# Patient Record
Sex: FEMALE | Race: BLACK OR AFRICAN AMERICAN | Employment: FULL TIME | URBAN - METROPOLITAN AREA
[De-identification: names, ages, dates, MRNs, and addresses within clinical notes are randomized per-mention and may not be internally consistent; named-entity substitution may affect disease eponyms.]

---

## 2017-07-19 ENCOUNTER — HOSPITAL ENCOUNTER (INPATIENT)
Age: 57
LOS: 2 days | Discharge: HOME | DRG: 069 | End: 2017-07-23
Attending: EMERGENCY MEDICINE | Admitting: HOSPITALIST
Payer: COMMERCIAL

## 2017-07-19 ENCOUNTER — APPOINTMENT (OUTPATIENT)
Dept: CT IMAGING | Age: 57
DRG: 069 | End: 2017-07-19
Payer: COMMERCIAL

## 2017-07-19 ENCOUNTER — APPOINTMENT (OUTPATIENT)
Dept: GENERAL RADIOLOGY | Age: 57
DRG: 069 | End: 2017-07-19
Payer: COMMERCIAL

## 2017-07-19 DIAGNOSIS — R41.82 ALTERED MENTAL STATUS, UNSPECIFIED ALTERED MENTAL STATUS TYPE: Primary | ICD-10-CM

## 2017-07-19 PROBLEM — N17.9 ACUTE KIDNEY INJURY (HCC): Status: ACTIVE | Noted: 2017-07-19

## 2017-07-19 PROBLEM — D72.819 LEUKOPENIA: Status: ACTIVE | Noted: 2017-07-19

## 2017-07-19 LAB
ALBUMIN SERPL-MCNC: 3.9 G/DL (ref 3.9–4.9)
ALP BLD-CCNC: 59 U/L (ref 40–130)
ALT SERPL-CCNC: 18 U/L (ref 0–33)
ANION GAP SERPL CALCULATED.3IONS-SCNC: 15 MEQ/L (ref 7–13)
ANISOCYTOSIS: ABNORMAL
APTT: 30.7 SEC (ref 21.6–35.4)
AST SERPL-CCNC: 20 U/L (ref 0–35)
ATYPICAL LYMPHOCYTE RELATIVE PERCENT: 8 %
BACTERIA: NORMAL /HPF
BANDED NEUTROPHILS RELATIVE PERCENT: 4 % (ref 5–11)
BASOPHILS ABSOLUTE: 0 K/UL (ref 0–0.2)
BASOPHILS RELATIVE PERCENT: 0.5 %
BILIRUB SERPL-MCNC: 1.7 MG/DL (ref 0–1.2)
BILIRUBIN URINE: NEGATIVE
BLOOD, URINE: ABNORMAL
BUN BLDV-MCNC: 16 MG/DL (ref 6–20)
C-REACTIVE PROTEIN, HIGH SENSITIVITY: 40.3 MG/L (ref 0–5)
CALCIUM SERPL-MCNC: 9 MG/DL (ref 8.6–10.2)
CHLORIDE BLD-SCNC: 96 MEQ/L (ref 98–107)
CLARITY: CLEAR
CO2: 21 MEQ/L (ref 22–29)
COLOR: YELLOW
CREAT SERPL-MCNC: 1.26 MG/DL (ref 0.5–0.9)
EOSINOPHILS ABSOLUTE: 0 K/UL (ref 0–0.7)
EOSINOPHILS RELATIVE PERCENT: 0.2 %
EPITHELIAL CELLS, UA: NORMAL /HPF
GFR AFRICAN AMERICAN: 53.2
GFR NON-AFRICAN AMERICAN: 44
GLOBULIN: 2.8 G/DL (ref 2.3–3.5)
GLUCOSE BLD-MCNC: 110 MG/DL (ref 74–109)
GLUCOSE URINE: NEGATIVE MG/DL
HCT VFR BLD CALC: 28.9 % (ref 37–47)
HEMATOLOGY PATH CONSULT: YES
HEMOGLOBIN: 9.6 G/DL (ref 12–16)
HYPOCHROMIA: ABNORMAL
INR BLD: 1
KETONES, URINE: NEGATIVE MG/DL
LACTIC ACID: 2 MMOL/L (ref 0.5–2.2)
LEUKOCYTE ESTERASE, URINE: NEGATIVE
LYMPHOCYTES ABSOLUTE: 2.2 K/UL (ref 1–4.8)
LYMPHOCYTES RELATIVE PERCENT: 59 %
MACROCYTES: 0
MCH RBC QN AUTO: 31 PG (ref 27–31.3)
MCHC RBC AUTO-ENTMCNC: 33.4 % (ref 33–37)
MCV RBC AUTO: 92.9 FL (ref 82–100)
MICROCYTES: 0
MONOCYTES ABSOLUTE: 0.5 K/UL (ref 0.2–0.8)
MONOCYTES RELATIVE PERCENT: 15.8 %
NEUTROPHILS ABSOLUTE: 0.6 K/UL (ref 1.4–6.5)
NEUTROPHILS RELATIVE PERCENT: 13 %
NITRITE, URINE: NEGATIVE
PDW BLD-RTO: 17.6 % (ref 11.5–14.5)
PH UA: 5 (ref 5–9)
PLATELET # BLD: 218 K/UL (ref 130–400)
PLATELET SLIDE REVIEW: ADEQUATE
POIKILOCYTES: 0
POLYCHROMASIA: 0
POTASSIUM SERPL-SCNC: 3.9 MEQ/L (ref 3.5–5.1)
PROTEIN UA: NEGATIVE MG/DL
PROTHROMBIN TIME: 10.7 SEC (ref 8.1–13.7)
RBC # BLD: 3.11 M/UL (ref 4.2–5.4)
RBC UA: NORMAL /HPF (ref 0–2)
RENAL EPITHELIAL, UA: NORMAL /HPF
SEDIMENTATION RATE, ERYTHROCYTE: 63 MM (ref 0–30)
SLIDE REVIEW: ABNORMAL
SODIUM BLD-SCNC: 132 MEQ/L (ref 132–144)
SPECIFIC GRAVITY UA: 1 (ref 1–1.03)
TOTAL CK: 62 U/L (ref 0–170)
TOTAL PROTEIN: 6.7 G/DL (ref 6.4–8.1)
TOXIC GRANULATION: ABNORMAL
TROPONIN: <0.01 NG/ML (ref 0–0.01)
TSH SERPL DL<=0.05 MIU/L-ACNC: 3.17 UIU/ML (ref 0.27–4.2)
URINE REFLEX TO CULTURE: YES
UROBILINOGEN, URINE: 0.2 E.U./DL
VACUOLATED NEUTROPHILS: ABNORMAL
WBC # BLD: 3.3 K/UL (ref 4.8–10.8)
WBC UA: NORMAL /HPF (ref 0–5)

## 2017-07-19 PROCEDURE — 87040 BLOOD CULTURE FOR BACTERIA: CPT

## 2017-07-19 PROCEDURE — 85652 RBC SED RATE AUTOMATED: CPT

## 2017-07-19 PROCEDURE — 99285 EMERGENCY DEPT VISIT HI MDM: CPT

## 2017-07-19 PROCEDURE — 6360000002 HC RX W HCPCS: Performed by: HOSPITALIST

## 2017-07-19 PROCEDURE — 85730 THROMBOPLASTIN TIME PARTIAL: CPT

## 2017-07-19 PROCEDURE — 96372 THER/PROPH/DIAG INJ SC/IM: CPT

## 2017-07-19 PROCEDURE — G0378 HOSPITAL OBSERVATION PER HR: HCPCS

## 2017-07-19 PROCEDURE — 80053 COMPREHEN METABOLIC PANEL: CPT

## 2017-07-19 PROCEDURE — 6370000000 HC RX 637 (ALT 250 FOR IP): Performed by: HOSPITALIST

## 2017-07-19 PROCEDURE — 71010 XR CHEST PORTABLE: CPT

## 2017-07-19 PROCEDURE — 93005 ELECTROCARDIOGRAM TRACING: CPT

## 2017-07-19 PROCEDURE — 96361 HYDRATE IV INFUSION ADD-ON: CPT

## 2017-07-19 PROCEDURE — 84443 ASSAY THYROID STIM HORMONE: CPT

## 2017-07-19 PROCEDURE — 6370000000 HC RX 637 (ALT 250 FOR IP): Performed by: EMERGENCY MEDICINE

## 2017-07-19 PROCEDURE — 82550 ASSAY OF CK (CPK): CPT

## 2017-07-19 PROCEDURE — 2580000003 HC RX 258: Performed by: EMERGENCY MEDICINE

## 2017-07-19 PROCEDURE — 36415 COLL VENOUS BLD VENIPUNCTURE: CPT

## 2017-07-19 PROCEDURE — 83605 ASSAY OF LACTIC ACID: CPT

## 2017-07-19 PROCEDURE — 85025 COMPLETE CBC W/AUTO DIFF WBC: CPT

## 2017-07-19 PROCEDURE — 84484 ASSAY OF TROPONIN QUANT: CPT

## 2017-07-19 PROCEDURE — 96374 THER/PROPH/DIAG INJ IV PUSH: CPT

## 2017-07-19 PROCEDURE — 81001 URINALYSIS AUTO W/SCOPE: CPT

## 2017-07-19 PROCEDURE — 85610 PROTHROMBIN TIME: CPT

## 2017-07-19 PROCEDURE — 87086 URINE CULTURE/COLONY COUNT: CPT

## 2017-07-19 PROCEDURE — 86141 C-REACTIVE PROTEIN HS: CPT

## 2017-07-19 PROCEDURE — 70450 CT HEAD/BRAIN W/O DYE: CPT

## 2017-07-19 PROCEDURE — 96375 TX/PRO/DX INJ NEW DRUG ADDON: CPT

## 2017-07-19 RX ORDER — DICYCLOMINE HYDROCHLORIDE 10 MG/1
10 CAPSULE ORAL EVERY 6 HOURS PRN
COMMUNITY
Start: 2017-06-07 | End: 2019-01-01

## 2017-07-19 RX ORDER — DOCUSATE SODIUM 100 MG/1
100 CAPSULE, LIQUID FILLED ORAL 2 TIMES DAILY
Status: DISCONTINUED | OUTPATIENT
Start: 2017-07-19 | End: 2017-07-23 | Stop reason: HOSPADM

## 2017-07-19 RX ORDER — SODIUM CHLORIDE 0.9 % (FLUSH) 0.9 %
10 SYRINGE (ML) INJECTION PRN
Status: DISCONTINUED | OUTPATIENT
Start: 2017-07-19 | End: 2017-07-23 | Stop reason: HOSPADM

## 2017-07-19 RX ORDER — FOLIC ACID 1 MG/1
1 TABLET ORAL DAILY
COMMUNITY
End: 2019-01-01

## 2017-07-19 RX ORDER — HYDRALAZINE HYDROCHLORIDE 25 MG/1
25 TABLET, FILM COATED ORAL 3 TIMES DAILY
COMMUNITY
Start: 2017-07-02 | End: 2019-01-01

## 2017-07-19 RX ORDER — SODIUM CHLORIDE 9 MG/ML
INJECTION, SOLUTION INTRAVENOUS CONTINUOUS
Status: DISCONTINUED | OUTPATIENT
Start: 2017-07-19 | End: 2017-07-23 | Stop reason: HOSPADM

## 2017-07-19 RX ORDER — PANTOPRAZOLE SODIUM 40 MG/1
40 TABLET, DELAYED RELEASE ORAL DAILY
Status: DISCONTINUED | OUTPATIENT
Start: 2017-07-19 | End: 2017-07-21

## 2017-07-19 RX ORDER — DIPHENOXYLATE HYDROCHLORIDE AND ATROPINE SULFATE 2.5; .025 MG/1; MG/1
1 TABLET ORAL EVERY 8 HOURS
COMMUNITY
Start: 2017-07-05 | End: 2019-01-01

## 2017-07-19 RX ORDER — DICYCLOMINE HYDROCHLORIDE 10 MG/1
10 CAPSULE ORAL 4 TIMES DAILY
Status: DISCONTINUED | OUTPATIENT
Start: 2017-07-19 | End: 2017-07-23 | Stop reason: HOSPADM

## 2017-07-19 RX ORDER — ACETAMINOPHEN 325 MG/1
650 TABLET ORAL EVERY 4 HOURS PRN
Status: DISCONTINUED | OUTPATIENT
Start: 2017-07-19 | End: 2017-07-23 | Stop reason: HOSPADM

## 2017-07-19 RX ORDER — SODIUM CHLORIDE 0.9 % (FLUSH) 0.9 %
10 SYRINGE (ML) INJECTION EVERY 12 HOURS SCHEDULED
Status: DISCONTINUED | OUTPATIENT
Start: 2017-07-19 | End: 2017-07-22 | Stop reason: SDUPTHER

## 2017-07-19 RX ORDER — LISINOPRIL 40 MG/1
40 TABLET ORAL DAILY
COMMUNITY
End: 2019-01-01

## 2017-07-19 RX ORDER — ONDANSETRON 2 MG/ML
4 INJECTION INTRAMUSCULAR; INTRAVENOUS EVERY 6 HOURS PRN
Status: DISCONTINUED | OUTPATIENT
Start: 2017-07-19 | End: 2017-07-23 | Stop reason: HOSPADM

## 2017-07-19 RX ORDER — SODIUM CHLORIDE 0.9 % (FLUSH) 0.9 %
10 SYRINGE (ML) INJECTION EVERY 12 HOURS SCHEDULED
Status: DISCONTINUED | OUTPATIENT
Start: 2017-07-19 | End: 2017-07-23 | Stop reason: HOSPADM

## 2017-07-19 RX ORDER — PANTOPRAZOLE SODIUM 40 MG/1
40 TABLET, DELAYED RELEASE ORAL DAILY
COMMUNITY
End: 2019-01-01

## 2017-07-19 RX ORDER — SODIUM CHLORIDE 9 MG/ML
INJECTION, SOLUTION INTRAVENOUS CONTINUOUS
Status: DISCONTINUED | OUTPATIENT
Start: 2017-07-19 | End: 2017-07-19 | Stop reason: SDUPTHER

## 2017-07-19 RX ORDER — FOLIC ACID 1 MG/1
1 TABLET ORAL DAILY
Status: DISCONTINUED | OUTPATIENT
Start: 2017-07-19 | End: 2017-07-23 | Stop reason: HOSPADM

## 2017-07-19 RX ORDER — ONDANSETRON 4 MG/1
4 TABLET, FILM COATED ORAL EVERY 8 HOURS PRN
Status: ON HOLD | COMMUNITY
Start: 2017-07-02 | End: 2017-07-23

## 2017-07-19 RX ORDER — ASPIRIN 325 MG
325 TABLET ORAL ONCE
Status: COMPLETED | OUTPATIENT
Start: 2017-07-19 | End: 2017-07-19

## 2017-07-19 RX ORDER — DIPHENOXYLATE HYDROCHLORIDE AND ATROPINE SULFATE 2.5; .025 MG/1; MG/1
1 TABLET ORAL EVERY 8 HOURS
Status: DISCONTINUED | OUTPATIENT
Start: 2017-07-19 | End: 2017-07-23 | Stop reason: HOSPADM

## 2017-07-19 RX ORDER — AMLODIPINE BESYLATE 10 MG/1
10 TABLET ORAL DAILY
COMMUNITY
Start: 2017-07-02 | End: 2019-01-01

## 2017-07-19 RX ORDER — 0.9 % SODIUM CHLORIDE 0.9 %
1000 INTRAVENOUS SOLUTION INTRAVENOUS ONCE
Status: COMPLETED | OUTPATIENT
Start: 2017-07-19 | End: 2017-07-19

## 2017-07-19 RX ORDER — LOPERAMIDE HYDROCHLORIDE 2 MG/1
2 CAPSULE ORAL
COMMUNITY
Start: 2017-07-05 | End: 2019-01-01

## 2017-07-19 RX ADMIN — FOLIC ACID 1 MG: 1 TABLET ORAL at 18:14

## 2017-07-19 RX ADMIN — SODIUM CHLORIDE 1000 ML: 9 INJECTION, SOLUTION INTRAVENOUS at 10:00

## 2017-07-19 RX ADMIN — SODIUM CHLORIDE: 9 INJECTION, SOLUTION INTRAVENOUS at 12:28

## 2017-07-19 RX ADMIN — DIPHENOXYLATE HYDROCHLORIDE AND ATROPINE SULFATE 1 TABLET: 2.5; .025 TABLET ORAL at 21:02

## 2017-07-19 RX ADMIN — ASPIRIN 325 MG: 325 TABLET, COATED ORAL at 12:28

## 2017-07-19 RX ADMIN — ENOXAPARIN SODIUM 40 MG: 40 INJECTION SUBCUTANEOUS at 18:14

## 2017-07-19 RX ADMIN — ONDANSETRON 4 MG: 2 INJECTION INTRAMUSCULAR; INTRAVENOUS at 18:14

## 2017-07-19 ASSESSMENT — ENCOUNTER SYMPTOMS
GASTROINTESTINAL NEGATIVE: 1
EYES NEGATIVE: 1
RESPIRATORY NEGATIVE: 1

## 2017-07-19 ASSESSMENT — PAIN SCALES - GENERAL: PAINLEVEL_OUTOF10: 6

## 2017-07-19 ASSESSMENT — PAIN DESCRIPTION - PAIN TYPE: TYPE: ACUTE PAIN

## 2017-07-19 ASSESSMENT — PAIN DESCRIPTION - LOCATION: LOCATION: HEAD

## 2017-07-20 ENCOUNTER — APPOINTMENT (OUTPATIENT)
Dept: ULTRASOUND IMAGING | Age: 57
DRG: 069 | End: 2017-07-20
Payer: COMMERCIAL

## 2017-07-20 LAB
ANION GAP SERPL CALCULATED.3IONS-SCNC: 9 MEQ/L (ref 7–13)
BASOPHILS ABSOLUTE: 0 K/UL (ref 0–0.2)
BASOPHILS RELATIVE PERCENT: 0.3 %
BUN BLDV-MCNC: 8 MG/DL (ref 6–20)
CALCIUM SERPL-MCNC: 8.5 MG/DL (ref 8.6–10.2)
CHLORIDE BLD-SCNC: 105 MEQ/L (ref 98–107)
CHOLESTEROL, TOTAL: 139 MG/DL (ref 0–199)
CO2: 23 MEQ/L (ref 22–29)
CREAT SERPL-MCNC: 0.93 MG/DL (ref 0.5–0.9)
EOSINOPHILS ABSOLUTE: 0 K/UL (ref 0–0.7)
EOSINOPHILS RELATIVE PERCENT: 0.2 %
GFR AFRICAN AMERICAN: >60
GFR NON-AFRICAN AMERICAN: >60
GLUCOSE BLD-MCNC: 82 MG/DL (ref 74–109)
HCT VFR BLD CALC: 22.9 % (ref 37–47)
HDLC SERPL-MCNC: 37 MG/DL (ref 40–59)
HEMATOLOGY PATH CONSULT: NORMAL
HEMOGLOBIN: 7.7 G/DL (ref 12–16)
LDL CHOLESTEROL CALCULATED: 81 MG/DL (ref 0–129)
LYMPHOCYTES ABSOLUTE: 1.6 K/UL (ref 1–4.8)
LYMPHOCYTES RELATIVE PERCENT: 35.1 %
MAGNESIUM: 1.2 MG/DL (ref 1.7–2.3)
MCH RBC QN AUTO: 30.9 PG (ref 27–31.3)
MCHC RBC AUTO-ENTMCNC: 33.5 % (ref 33–37)
MCV RBC AUTO: 92.2 FL (ref 82–100)
MONOCYTES ABSOLUTE: 1.1 K/UL (ref 0.2–0.8)
MONOCYTES RELATIVE PERCENT: 22.8 %
NEUTROPHILS ABSOLUTE: 1.9 K/UL (ref 1.4–6.5)
NEUTROPHILS RELATIVE PERCENT: 41.6 %
PDW BLD-RTO: 17.3 % (ref 11.5–14.5)
PLATELET # BLD: 145 K/UL (ref 130–400)
POTASSIUM SERPL-SCNC: 3.9 MEQ/L (ref 3.5–5.1)
RBC # BLD: 2.48 M/UL (ref 4.2–5.4)
SODIUM BLD-SCNC: 137 MEQ/L (ref 132–144)
TRIGL SERPL-MCNC: 104 MG/DL (ref 0–200)
WBC # BLD: 4.6 K/UL (ref 4.8–10.8)

## 2017-07-20 PROCEDURE — 80048 BASIC METABOLIC PNL TOTAL CA: CPT

## 2017-07-20 PROCEDURE — 36415 COLL VENOUS BLD VENIPUNCTURE: CPT

## 2017-07-20 PROCEDURE — 83735 ASSAY OF MAGNESIUM: CPT

## 2017-07-20 PROCEDURE — 93880 EXTRACRANIAL BILAT STUDY: CPT

## 2017-07-20 PROCEDURE — 96376 TX/PRO/DX INJ SAME DRUG ADON: CPT

## 2017-07-20 PROCEDURE — 96366 THER/PROPH/DIAG IV INF ADDON: CPT

## 2017-07-20 PROCEDURE — 6360000002 HC RX W HCPCS: Performed by: HOSPITALIST

## 2017-07-20 PROCEDURE — 6370000000 HC RX 637 (ALT 250 FOR IP): Performed by: PSYCHIATRY & NEUROLOGY

## 2017-07-20 PROCEDURE — G0378 HOSPITAL OBSERVATION PER HR: HCPCS

## 2017-07-20 PROCEDURE — 85025 COMPLETE CBC W/AUTO DIFF WBC: CPT

## 2017-07-20 PROCEDURE — 96365 THER/PROPH/DIAG IV INF INIT: CPT

## 2017-07-20 PROCEDURE — 80061 LIPID PANEL: CPT

## 2017-07-20 PROCEDURE — 2580000003 HC RX 258: Performed by: HOSPITALIST

## 2017-07-20 PROCEDURE — 6370000000 HC RX 637 (ALT 250 FOR IP): Performed by: HOSPITALIST

## 2017-07-20 RX ORDER — MAGNESIUM SULFATE IN WATER 40 MG/ML
4 INJECTION, SOLUTION INTRAVENOUS ONCE
Status: COMPLETED | OUTPATIENT
Start: 2017-07-20 | End: 2017-07-20

## 2017-07-20 RX ORDER — ASPIRIN 81 MG/1
81 TABLET, CHEWABLE ORAL DAILY
Status: DISCONTINUED | OUTPATIENT
Start: 2017-07-20 | End: 2017-07-23 | Stop reason: HOSPADM

## 2017-07-20 RX ADMIN — DIPHENOXYLATE HYDROCHLORIDE AND ATROPINE SULFATE 1 TABLET: 2.5; .025 TABLET ORAL at 10:01

## 2017-07-20 RX ADMIN — ASPIRIN 81 MG 81 MG: 81 TABLET ORAL at 21:33

## 2017-07-20 RX ADMIN — ONDANSETRON 4 MG: 2 INJECTION INTRAMUSCULAR; INTRAVENOUS at 18:18

## 2017-07-20 RX ADMIN — SODIUM CHLORIDE 100 ML/HR: 9 INJECTION, SOLUTION INTRAVENOUS at 21:34

## 2017-07-20 RX ADMIN — Medication 10 ML: at 09:53

## 2017-07-20 RX ADMIN — SODIUM CHLORIDE: 9 INJECTION, SOLUTION INTRAVENOUS at 01:42

## 2017-07-20 RX ADMIN — SODIUM CHLORIDE: 9 INJECTION, SOLUTION INTRAVENOUS at 10:01

## 2017-07-20 RX ADMIN — DIPHENOXYLATE HYDROCHLORIDE AND ATROPINE SULFATE 1 TABLET: 2.5; .025 TABLET ORAL at 21:33

## 2017-07-20 RX ADMIN — MAGNESIUM SULFATE IN WATER 4 G: 40 INJECTION, SOLUTION INTRAVENOUS at 12:02

## 2017-07-20 RX ADMIN — ONDANSETRON 4 MG: 2 INJECTION INTRAMUSCULAR; INTRAVENOUS at 09:49

## 2017-07-20 RX ADMIN — PANTOPRAZOLE SODIUM 40 MG: 40 TABLET, DELAYED RELEASE ORAL at 09:49

## 2017-07-20 RX ADMIN — FOLIC ACID 1 MG: 1 TABLET ORAL at 09:50

## 2017-07-21 PROBLEM — D59.9 ACUTE HEMOLYTIC ANEMIA (HCC): Status: ACTIVE | Noted: 2017-07-21

## 2017-07-21 LAB
ABO/RH: NORMAL
ANTIBODY SCREEN: NORMAL
BACTERIA: NORMAL /HPF
BASOPHILS ABSOLUTE: 0 K/UL (ref 0–0.2)
BASOPHILS RELATIVE PERCENT: 0.3 %
BILIRUBIN URINE: NEGATIVE
BLOOD, URINE: ABNORMAL
CLARITY: CLEAR
COLOR: YELLOW
CREATININE URINE: 53.8 MG/DL
EOSINOPHILS ABSOLUTE: 0 K/UL (ref 0–0.7)
EOSINOPHILS RELATIVE PERCENT: 0 %
EPITHELIAL CELLS, UA: NORMAL /HPF
GLUCOSE URINE: NEGATIVE MG/DL
HCT VFR BLD CALC: 22.8 % (ref 37–47)
HEMOCCULT STL QL: ABNORMAL
HEMOGLOBIN: 7.5 G/DL (ref 12–16)
KETONES, URINE: NEGATIVE MG/DL
LEUKOCYTE ESTERASE, URINE: NEGATIVE
LYMPHOCYTES ABSOLUTE: 1.6 K/UL (ref 1–4.8)
LYMPHOCYTES RELATIVE PERCENT: 19.8 %
MAGNESIUM: 1.8 MG/DL (ref 1.7–2.3)
MCH RBC QN AUTO: 31.1 PG (ref 27–31.3)
MCHC RBC AUTO-ENTMCNC: 32.9 % (ref 33–37)
MCV RBC AUTO: 94.6 FL (ref 82–100)
MONOCYTES ABSOLUTE: 1 K/UL (ref 0.2–0.8)
MONOCYTES RELATIVE PERCENT: 12.9 %
NEUTROPHILS ABSOLUTE: 5.4 K/UL (ref 1.4–6.5)
NEUTROPHILS RELATIVE PERCENT: 67 %
NITRITE, URINE: NEGATIVE
PDW BLD-RTO: 17.9 % (ref 11.5–14.5)
PH UA: 5 (ref 5–9)
PLATELET # BLD: 150 K/UL (ref 130–400)
PROTEIN UA: NEGATIVE MG/DL
RBC # BLD: 2.41 M/UL (ref 4.2–5.4)
RBC UA: NORMAL /HPF (ref 0–2)
SLIDE REVIEW: ABNORMAL
SODIUM URINE: 131 MEQ/L
SPECIFIC GRAVITY UA: 1.01 (ref 1–1.03)
URINE CULTURE, ROUTINE: NORMAL
UROBILINOGEN, URINE: 0.2 E.U./DL
WBC # BLD: 8.1 K/UL (ref 4.8–10.8)
WBC UA: NORMAL /HPF (ref 0–5)

## 2017-07-21 PROCEDURE — 83735 ASSAY OF MAGNESIUM: CPT

## 2017-07-21 PROCEDURE — 81001 URINALYSIS AUTO W/SCOPE: CPT

## 2017-07-21 PROCEDURE — 86920 COMPATIBILITY TEST SPIN: CPT

## 2017-07-21 PROCEDURE — 86900 BLOOD TYPING SEROLOGIC ABO: CPT

## 2017-07-21 PROCEDURE — 1210000000 HC MED SURG R&B

## 2017-07-21 PROCEDURE — 2580000003 HC RX 258: Performed by: HOSPITALIST

## 2017-07-21 PROCEDURE — 36415 COLL VENOUS BLD VENIPUNCTURE: CPT

## 2017-07-21 PROCEDURE — 82274 ASSAY TEST FOR BLOOD FECAL: CPT

## 2017-07-21 PROCEDURE — 86901 BLOOD TYPING SEROLOGIC RH(D): CPT

## 2017-07-21 PROCEDURE — S0028 INJECTION, FAMOTIDINE, 20 MG: HCPCS | Performed by: HOSPITALIST

## 2017-07-21 PROCEDURE — 2500000003 HC RX 250 WO HCPCS: Performed by: HOSPITALIST

## 2017-07-21 PROCEDURE — 86850 RBC ANTIBODY SCREEN: CPT

## 2017-07-21 PROCEDURE — 6370000000 HC RX 637 (ALT 250 FOR IP): Performed by: PSYCHIATRY & NEUROLOGY

## 2017-07-21 PROCEDURE — 84300 ASSAY OF URINE SODIUM: CPT

## 2017-07-21 PROCEDURE — 6370000000 HC RX 637 (ALT 250 FOR IP): Performed by: HOSPITALIST

## 2017-07-21 PROCEDURE — 96376 TX/PRO/DX INJ SAME DRUG ADON: CPT

## 2017-07-21 PROCEDURE — 6360000002 HC RX W HCPCS: Performed by: HOSPITALIST

## 2017-07-21 PROCEDURE — 82570 ASSAY OF URINE CREATININE: CPT

## 2017-07-21 PROCEDURE — 85025 COMPLETE CBC W/AUTO DIFF WBC: CPT

## 2017-07-21 RX ORDER — ATORVASTATIN CALCIUM 10 MG/1
10 TABLET, FILM COATED ORAL NIGHTLY
Qty: 30 TABLET | Refills: 1 | Status: SHIPPED | OUTPATIENT
Start: 2017-07-21 | End: 2017-07-23

## 2017-07-21 RX ORDER — ATORVASTATIN CALCIUM 10 MG/1
10 TABLET, FILM COATED ORAL NIGHTLY
Status: DISCONTINUED | OUTPATIENT
Start: 2017-07-21 | End: 2017-07-23 | Stop reason: HOSPADM

## 2017-07-21 RX ADMIN — SODIUM CHLORIDE: 9 INJECTION, SOLUTION INTRAVENOUS at 09:05

## 2017-07-21 RX ADMIN — ASPIRIN 81 MG 81 MG: 81 TABLET ORAL at 09:05

## 2017-07-21 RX ADMIN — SODIUM CHLORIDE, PRESERVATIVE FREE 10 ML: 5 INJECTION INTRAVENOUS at 22:11

## 2017-07-21 RX ADMIN — Medication 10 ML: at 22:13

## 2017-07-21 RX ADMIN — FAMOTIDINE 20 MG: 10 INJECTION, SOLUTION INTRAVENOUS at 22:10

## 2017-07-21 RX ADMIN — DIPHENOXYLATE HYDROCHLORIDE AND ATROPINE SULFATE 1 TABLET: 2.5; .025 TABLET ORAL at 14:12

## 2017-07-21 RX ADMIN — ONDANSETRON 4 MG: 2 INJECTION INTRAMUSCULAR; INTRAVENOUS at 09:13

## 2017-07-21 RX ADMIN — PANTOPRAZOLE SODIUM 40 MG: 40 TABLET, DELAYED RELEASE ORAL at 09:05

## 2017-07-21 RX ADMIN — FOLIC ACID 1 MG: 1 TABLET ORAL at 09:05

## 2017-07-21 RX ADMIN — SODIUM CHLORIDE: 9 INJECTION, SOLUTION INTRAVENOUS at 17:47

## 2017-07-21 RX ADMIN — Medication 800 MG: at 14:12

## 2017-07-21 RX ADMIN — DIPHENOXYLATE HYDROCHLORIDE AND ATROPINE SULFATE 1 TABLET: 2.5; .025 TABLET ORAL at 05:23

## 2017-07-21 RX ADMIN — DIPHENOXYLATE HYDROCHLORIDE AND ATROPINE SULFATE 1 TABLET: 2.5; .025 TABLET ORAL at 22:10

## 2017-07-22 LAB
BLOOD BANK DISPENSE STATUS: NORMAL
BLOOD BANK DISPENSE STATUS: NORMAL
BLOOD BANK PRODUCT CODE: NORMAL
BLOOD BANK PRODUCT CODE: NORMAL
BPU ID: NORMAL
BPU ID: NORMAL
DESCRIPTION BLOOD BANK: NORMAL
DESCRIPTION BLOOD BANK: NORMAL
HCT VFR BLD CALC: 22.8 % (ref 37–47)
HEMOGLOBIN: 7.6 G/DL (ref 12–16)
MAGNESIUM: 1.4 MG/DL (ref 1.7–2.3)
MCH RBC QN AUTO: 31.2 PG (ref 27–31.3)
MCHC RBC AUTO-ENTMCNC: 33.5 % (ref 33–37)
MCV RBC AUTO: 93.2 FL (ref 82–100)
PDW BLD-RTO: 17.4 % (ref 11.5–14.5)
PLATELET # BLD: 141 K/UL (ref 130–400)
RBC # BLD: 2.45 M/UL (ref 4.2–5.4)
WBC # BLD: 10.7 K/UL (ref 4.8–10.8)

## 2017-07-22 PROCEDURE — 6370000000 HC RX 637 (ALT 250 FOR IP): Performed by: INTERNAL MEDICINE

## 2017-07-22 PROCEDURE — 99253 IP/OBS CNSLTJ NEW/EST LOW 45: CPT | Performed by: INTERNAL MEDICINE

## 2017-07-22 PROCEDURE — 36415 COLL VENOUS BLD VENIPUNCTURE: CPT

## 2017-07-22 PROCEDURE — 6370000000 HC RX 637 (ALT 250 FOR IP): Performed by: HOSPITALIST

## 2017-07-22 PROCEDURE — 1210000000 HC MED SURG R&B

## 2017-07-22 PROCEDURE — 2580000003 HC RX 258: Performed by: INTERNAL MEDICINE

## 2017-07-22 PROCEDURE — P9016 RBC LEUKOCYTES REDUCED: HCPCS

## 2017-07-22 PROCEDURE — 85027 COMPLETE CBC AUTOMATED: CPT

## 2017-07-22 PROCEDURE — 2580000003 HC RX 258: Performed by: HOSPITALIST

## 2017-07-22 PROCEDURE — S0028 INJECTION, FAMOTIDINE, 20 MG: HCPCS | Performed by: HOSPITALIST

## 2017-07-22 PROCEDURE — 36430 TRANSFUSION BLD/BLD COMPNT: CPT

## 2017-07-22 PROCEDURE — 83735 ASSAY OF MAGNESIUM: CPT

## 2017-07-22 PROCEDURE — 2500000003 HC RX 250 WO HCPCS: Performed by: HOSPITALIST

## 2017-07-22 PROCEDURE — 87493 C DIFF AMPLIFIED PROBE: CPT

## 2017-07-22 PROCEDURE — 6360000002 HC RX W HCPCS: Performed by: HOSPITALIST

## 2017-07-22 RX ORDER — DIPHENHYDRAMINE HCL 25 MG
25 TABLET ORAL SEE ADMIN INSTRUCTIONS
Status: DISCONTINUED | OUTPATIENT
Start: 2017-07-22 | End: 2017-07-23 | Stop reason: HOSPADM

## 2017-07-22 RX ORDER — CHOLESTYRAMINE LIGHT 4 G/5.7G
4 POWDER, FOR SUSPENSION ORAL 2 TIMES DAILY
Status: DISCONTINUED | OUTPATIENT
Start: 2017-07-22 | End: 2017-07-23 | Stop reason: HOSPADM

## 2017-07-22 RX ORDER — 0.9 % SODIUM CHLORIDE 0.9 %
250 INTRAVENOUS SOLUTION INTRAVENOUS ONCE
Status: COMPLETED | OUTPATIENT
Start: 2017-07-22 | End: 2017-07-22

## 2017-07-22 RX ORDER — ACETAMINOPHEN 325 MG/1
650 TABLET ORAL SEE ADMIN INSTRUCTIONS
Status: DISCONTINUED | OUTPATIENT
Start: 2017-07-22 | End: 2017-07-23 | Stop reason: HOSPADM

## 2017-07-22 RX ADMIN — DIPHENOXYLATE HYDROCHLORIDE AND ATROPINE SULFATE 1 TABLET: 2.5; .025 TABLET ORAL at 05:22

## 2017-07-22 RX ADMIN — SODIUM CHLORIDE 250 ML: 9 INJECTION, SOLUTION INTRAVENOUS at 16:14

## 2017-07-22 RX ADMIN — FAMOTIDINE 20 MG: 10 INJECTION, SOLUTION INTRAVENOUS at 08:27

## 2017-07-22 RX ADMIN — CHOLESTYRAMINE 4 G: 4 POWDER, FOR SUSPENSION ORAL at 16:13

## 2017-07-22 RX ADMIN — ONDANSETRON 4 MG: 2 INJECTION INTRAMUSCULAR; INTRAVENOUS at 19:16

## 2017-07-22 RX ADMIN — FAMOTIDINE 20 MG: 10 INJECTION, SOLUTION INTRAVENOUS at 21:55

## 2017-07-22 RX ADMIN — ACETAMINOPHEN 650 MG: 325 TABLET ORAL at 12:29

## 2017-07-22 RX ADMIN — DIPHENOXYLATE HYDROCHLORIDE AND ATROPINE SULFATE 1 TABLET: 2.5; .025 TABLET ORAL at 12:29

## 2017-07-22 RX ADMIN — FOLIC ACID 1 MG: 1 TABLET ORAL at 08:27

## 2017-07-22 RX ADMIN — SODIUM CHLORIDE 1000 ML: 9 INJECTION, SOLUTION INTRAVENOUS at 03:27

## 2017-07-22 RX ADMIN — DICYCLOMINE HYDROCHLORIDE 10 MG: 10 CAPSULE ORAL at 21:55

## 2017-07-22 RX ADMIN — DIPHENOXYLATE HYDROCHLORIDE AND ATROPINE SULFATE 1 TABLET: 2.5; .025 TABLET ORAL at 21:55

## 2017-07-22 RX ADMIN — ONDANSETRON 4 MG: 2 INJECTION INTRAMUSCULAR; INTRAVENOUS at 08:26

## 2017-07-22 ASSESSMENT — PAIN SCALES - GENERAL: PAINLEVEL_OUTOF10: 8

## 2017-07-23 VITALS
HEART RATE: 98 BPM | HEIGHT: 65 IN | RESPIRATION RATE: 18 BRPM | OXYGEN SATURATION: 99 % | BODY MASS INDEX: 35.59 KG/M2 | TEMPERATURE: 98.1 F | WEIGHT: 213.63 LBS | SYSTOLIC BLOOD PRESSURE: 137 MMHG | DIASTOLIC BLOOD PRESSURE: 60 MMHG

## 2017-07-23 LAB
BASOPHILS ABSOLUTE: 0 K/UL (ref 0–0.2)
BASOPHILS RELATIVE PERCENT: 0.5 %
CLOSTRIDIUM DIFFICILE DNA AMPLIFICATION: ABNORMAL
EOSINOPHILS ABSOLUTE: 0 K/UL (ref 0–0.7)
EOSINOPHILS RELATIVE PERCENT: 0 %
HCT VFR BLD CALC: 27.9 % (ref 37–47)
HEMOGLOBIN: 9.5 G/DL (ref 12–16)
LYMPHOCYTES ABSOLUTE: 1.6 K/UL (ref 1–4.8)
LYMPHOCYTES RELATIVE PERCENT: 18.7 %
MAGNESIUM: 1.1 MG/DL (ref 1.7–2.3)
MCH RBC QN AUTO: 31.1 PG (ref 27–31.3)
MCHC RBC AUTO-ENTMCNC: 34.1 % (ref 33–37)
MCV RBC AUTO: 91.3 FL (ref 82–100)
MONOCYTES ABSOLUTE: 0.7 K/UL (ref 0.2–0.8)
MONOCYTES RELATIVE PERCENT: 8.5 %
NEUTROPHILS ABSOLUTE: 6.1 K/UL (ref 1.4–6.5)
NEUTROPHILS RELATIVE PERCENT: 72.3 %
PDW BLD-RTO: 17 % (ref 11.5–14.5)
PLATELET # BLD: 119 K/UL (ref 130–400)
RBC # BLD: 3.05 M/UL (ref 4.2–5.4)
WBC # BLD: 8.5 K/UL (ref 4.8–10.8)

## 2017-07-23 PROCEDURE — S0028 INJECTION, FAMOTIDINE, 20 MG: HCPCS | Performed by: HOSPITALIST

## 2017-07-23 PROCEDURE — 2580000003 HC RX 258: Performed by: HOSPITALIST

## 2017-07-23 PROCEDURE — 85025 COMPLETE CBC W/AUTO DIFF WBC: CPT

## 2017-07-23 PROCEDURE — 36415 COLL VENOUS BLD VENIPUNCTURE: CPT

## 2017-07-23 PROCEDURE — 6370000000 HC RX 637 (ALT 250 FOR IP): Performed by: HOSPITALIST

## 2017-07-23 PROCEDURE — 2500000003 HC RX 250 WO HCPCS: Performed by: HOSPITALIST

## 2017-07-23 PROCEDURE — 6360000002 HC RX W HCPCS: Performed by: HOSPITALIST

## 2017-07-23 PROCEDURE — 83735 ASSAY OF MAGNESIUM: CPT

## 2017-07-23 PROCEDURE — 6370000000 HC RX 637 (ALT 250 FOR IP): Performed by: PSYCHIATRY & NEUROLOGY

## 2017-07-23 RX ORDER — ATORVASTATIN CALCIUM 10 MG/1
10 TABLET, FILM COATED ORAL NIGHTLY
Qty: 30 TABLET | Refills: 1 | Status: SHIPPED | OUTPATIENT
Start: 2017-07-23 | End: 2019-01-01

## 2017-07-23 RX ORDER — CHOLESTYRAMINE LIGHT 4 G/5.7G
4 POWDER, FOR SUSPENSION ORAL 2 TIMES DAILY
Qty: 60 PACKET | Refills: 3 | Status: SHIPPED | OUTPATIENT
Start: 2017-07-23 | End: 2019-01-01

## 2017-07-23 RX ORDER — ONDANSETRON 4 MG/1
4 TABLET, FILM COATED ORAL EVERY 8 HOURS PRN
Qty: 60 TABLET | Refills: 1 | Status: SHIPPED | OUTPATIENT
Start: 2017-07-23 | End: 2019-01-01

## 2017-07-23 RX ORDER — METRONIDAZOLE 500 MG/1
500 TABLET ORAL 3 TIMES DAILY
Qty: 30 TABLET | Refills: 0 | Status: SHIPPED | OUTPATIENT
Start: 2017-07-23 | End: 2017-08-02

## 2017-07-23 RX ADMIN — DIPHENOXYLATE HYDROCHLORIDE AND ATROPINE SULFATE 1 TABLET: 2.5; .025 TABLET ORAL at 05:43

## 2017-07-23 RX ADMIN — FAMOTIDINE 20 MG: 10 INJECTION, SOLUTION INTRAVENOUS at 08:32

## 2017-07-23 RX ADMIN — DICYCLOMINE HYDROCHLORIDE 10 MG: 10 CAPSULE ORAL at 12:59

## 2017-07-23 RX ADMIN — DIPHENOXYLATE HYDROCHLORIDE AND ATROPINE SULFATE 1 TABLET: 2.5; .025 TABLET ORAL at 12:59

## 2017-07-23 RX ADMIN — ASPIRIN 81 MG 81 MG: 81 TABLET ORAL at 08:32

## 2017-07-23 RX ADMIN — DICYCLOMINE HYDROCHLORIDE 10 MG: 10 CAPSULE ORAL at 08:32

## 2017-07-23 RX ADMIN — SODIUM CHLORIDE: 9 INJECTION, SOLUTION INTRAVENOUS at 08:31

## 2017-07-23 RX ADMIN — Medication 10 ML: at 08:32

## 2017-07-23 RX ADMIN — ONDANSETRON 4 MG: 2 INJECTION INTRAMUSCULAR; INTRAVENOUS at 07:40

## 2017-07-23 RX ADMIN — FOLIC ACID 1 MG: 1 TABLET ORAL at 08:32

## 2017-07-24 LAB
BLOOD CULTURE, ROUTINE: NORMAL
CULTURE, BLOOD 2: NORMAL
EKG ATRIAL RATE: 92 BPM
EKG P AXIS: 48 DEGREES
EKG P-R INTERVAL: 158 MS
EKG Q-T INTERVAL: 338 MS
EKG QRS DURATION: 74 MS
EKG QTC CALCULATION (BAZETT): 417 MS
EKG R AXIS: 6 DEGREES
EKG T AXIS: -13 DEGREES
EKG VENTRICULAR RATE: 92 BPM

## 2017-09-21 ENCOUNTER — HOSPITAL ENCOUNTER (EMERGENCY)
Age: 57
Discharge: HOME | End: 2017-09-21
Attending: STUDENT IN AN ORGANIZED HEALTH CARE EDUCATION/TRAINING PROGRAM
Payer: COMMERCIAL

## 2017-09-21 ENCOUNTER — APPOINTMENT (OUTPATIENT)
Dept: CT IMAGING | Age: 57
End: 2017-09-21
Payer: COMMERCIAL

## 2017-09-21 VITALS
TEMPERATURE: 98.5 F | WEIGHT: 207 LBS | OXYGEN SATURATION: 98 % | BODY MASS INDEX: 34.45 KG/M2 | SYSTOLIC BLOOD PRESSURE: 146 MMHG | HEART RATE: 83 BPM | RESPIRATION RATE: 17 BRPM | DIASTOLIC BLOOD PRESSURE: 91 MMHG

## 2017-09-21 DIAGNOSIS — R55 SYNCOPE AND COLLAPSE: Primary | ICD-10-CM

## 2017-09-21 DIAGNOSIS — I95.9 TRANSIENT HYPOTENSION: ICD-10-CM

## 2017-09-21 DIAGNOSIS — G45.9 TRANSIENT CEREBRAL ISCHEMIA, UNSPECIFIED TYPE: ICD-10-CM

## 2017-09-21 DIAGNOSIS — R47.81 SLURRED SPEECH: ICD-10-CM

## 2017-09-21 LAB
ABO/RH: NORMAL
ALBUMIN SERPL-MCNC: 3.7 G/DL (ref 3.9–4.9)
ALP BLD-CCNC: 47 U/L (ref 40–130)
ALT SERPL-CCNC: 7 U/L (ref 0–33)
ANION GAP SERPL CALCULATED.3IONS-SCNC: 12 MEQ/L (ref 7–13)
ANTIBODY SCREEN: NORMAL
APTT: 27.4 SEC (ref 21.6–35.4)
AST SERPL-CCNC: 13 U/L (ref 0–35)
BASOPHILS ABSOLUTE: 0.1 K/UL (ref 0–0.2)
BASOPHILS RELATIVE PERCENT: 0.6 %
BILIRUB SERPL-MCNC: 0.6 MG/DL (ref 0–1.2)
BUN BLDV-MCNC: 11 MG/DL (ref 6–20)
C-REACTIVE PROTEIN, HIGH SENSITIVITY: 15.3 MG/L (ref 0–5)
CALCIUM SERPL-MCNC: 9.2 MG/DL (ref 8.6–10.2)
CHLORIDE BLD-SCNC: 107 MEQ/L (ref 98–107)
CHOLESTEROL, TOTAL: 240 MG/DL (ref 0–199)
CHP ED QC CHECK: YES
CO2: 24 MEQ/L (ref 22–29)
CREAT SERPL-MCNC: 1.23 MG/DL (ref 0.5–0.9)
EOSINOPHILS ABSOLUTE: 0.3 K/UL (ref 0–0.7)
EOSINOPHILS RELATIVE PERCENT: 1.9 %
GFR AFRICAN AMERICAN: 54.7
GFR NON-AFRICAN AMERICAN: 45.2
GLOBULIN: 2.5 G/DL (ref 2.3–3.5)
GLUCOSE BLD-MCNC: 100 MG/DL
GLUCOSE BLD-MCNC: 100 MG/DL (ref 60–115)
GLUCOSE BLD-MCNC: 95 MG/DL (ref 74–109)
HCT VFR BLD CALC: 31.6 % (ref 37–47)
HDLC SERPL-MCNC: 62 MG/DL (ref 40–59)
HEMOGLOBIN: 10.2 G/DL (ref 12–16)
INR BLD: 1
LACTIC ACID: 1.9 MMOL/L (ref 0.5–2.2)
LDL CHOLESTEROL CALCULATED: 140 MG/DL (ref 0–129)
LYMPHOCYTES ABSOLUTE: 2.5 K/UL (ref 1–4.8)
LYMPHOCYTES RELATIVE PERCENT: 18.9 %
MAGNESIUM: 1.6 MG/DL (ref 1.7–2.3)
MCH RBC QN AUTO: 32.5 PG (ref 27–31.3)
MCHC RBC AUTO-ENTMCNC: 32.3 % (ref 33–37)
MCV RBC AUTO: 100.6 FL (ref 82–100)
MONOCYTES ABSOLUTE: 0.8 K/UL (ref 0.2–0.8)
MONOCYTES RELATIVE PERCENT: 6.5 %
NEUTROPHILS ABSOLUTE: 9.4 K/UL (ref 1.4–6.5)
NEUTROPHILS RELATIVE PERCENT: 72.1 %
PDW BLD-RTO: 16.6 % (ref 11.5–14.5)
PERFORMED ON: NORMAL
PLATELET # BLD: 311 K/UL (ref 130–400)
POTASSIUM SERPL-SCNC: 4.9 MEQ/L (ref 3.5–5.1)
PRO-BNP: 1020 PG/ML
PROLACTIN: 52.4 NG/ML
PROTHROMBIN TIME: 10.7 SEC (ref 8.1–13.7)
RBC # BLD: 3.14 M/UL (ref 4.2–5.4)
SODIUM BLD-SCNC: 143 MEQ/L (ref 132–144)
TOTAL CK: 74 U/L (ref 0–170)
TOTAL PROTEIN: 6.2 G/DL (ref 6.4–8.1)
TRIGL SERPL-MCNC: 192 MG/DL (ref 0–200)
TROPONIN: <0.01 NG/ML (ref 0–0.01)
WBC # BLD: 13.1 K/UL (ref 4.8–10.8)

## 2017-09-21 PROCEDURE — 36415 COLL VENOUS BLD VENIPUNCTURE: CPT

## 2017-09-21 PROCEDURE — 93005 ELECTROCARDIOGRAM TRACING: CPT

## 2017-09-21 PROCEDURE — 80053 COMPREHEN METABOLIC PANEL: CPT

## 2017-09-21 PROCEDURE — 71275 CT ANGIOGRAPHY CHEST: CPT

## 2017-09-21 PROCEDURE — 84484 ASSAY OF TROPONIN QUANT: CPT

## 2017-09-21 PROCEDURE — 80061 LIPID PANEL: CPT

## 2017-09-21 PROCEDURE — 83880 ASSAY OF NATRIURETIC PEPTIDE: CPT

## 2017-09-21 PROCEDURE — 86901 BLOOD TYPING SEROLOGIC RH(D): CPT

## 2017-09-21 PROCEDURE — 83735 ASSAY OF MAGNESIUM: CPT

## 2017-09-21 PROCEDURE — 86900 BLOOD TYPING SEROLOGIC ABO: CPT

## 2017-09-21 PROCEDURE — 6370000000 HC RX 637 (ALT 250 FOR IP): Performed by: STUDENT IN AN ORGANIZED HEALTH CARE EDUCATION/TRAINING PROGRAM

## 2017-09-21 PROCEDURE — 85025 COMPLETE CBC W/AUTO DIFF WBC: CPT

## 2017-09-21 PROCEDURE — 84146 ASSAY OF PROLACTIN: CPT

## 2017-09-21 PROCEDURE — 82550 ASSAY OF CK (CPK): CPT

## 2017-09-21 PROCEDURE — 83605 ASSAY OF LACTIC ACID: CPT

## 2017-09-21 PROCEDURE — 85610 PROTHROMBIN TIME: CPT

## 2017-09-21 PROCEDURE — 85730 THROMBOPLASTIN TIME PARTIAL: CPT

## 2017-09-21 PROCEDURE — 2580000003 HC RX 258: Performed by: NURSE PRACTITIONER

## 2017-09-21 PROCEDURE — 86850 RBC ANTIBODY SCREEN: CPT

## 2017-09-21 PROCEDURE — 70450 CT HEAD/BRAIN W/O DYE: CPT

## 2017-09-21 PROCEDURE — 6360000004 HC RX CONTRAST MEDICATION: Performed by: RADIOLOGY

## 2017-09-21 PROCEDURE — 86141 C-REACTIVE PROTEIN HS: CPT

## 2017-09-21 PROCEDURE — 99285 EMERGENCY DEPT VISIT HI MDM: CPT

## 2017-09-21 RX ORDER — HYDROCODONE BITARTRATE AND ACETAMINOPHEN 5; 325 MG/1; MG/1
1 TABLET ORAL EVERY 6 HOURS PRN
COMMUNITY
End: 2019-01-01

## 2017-09-21 RX ORDER — 0.9 % SODIUM CHLORIDE 0.9 %
1000 INTRAVENOUS SOLUTION INTRAVENOUS ONCE
Status: COMPLETED | OUTPATIENT
Start: 2017-09-21 | End: 2017-09-21

## 2017-09-21 RX ORDER — ASPIRIN 81 MG/1
81 TABLET, CHEWABLE ORAL ONCE
Status: COMPLETED | OUTPATIENT
Start: 2017-09-21 | End: 2017-09-21

## 2017-09-21 RX ORDER — ASPIRIN 81 MG/1
81 TABLET, CHEWABLE ORAL DAILY
Qty: 30 TABLET | Refills: 0 | Status: SHIPPED | OUTPATIENT
Start: 2017-09-21 | End: 2019-01-01

## 2017-09-21 RX ADMIN — ASPIRIN 81 MG 81 MG: 81 TABLET ORAL at 21:40

## 2017-09-21 RX ADMIN — SODIUM CHLORIDE 1000 ML: 9 INJECTION, SOLUTION INTRAVENOUS at 20:24

## 2017-09-21 RX ADMIN — IOPAMIDOL 100 ML: 755 INJECTION, SOLUTION INTRAVENOUS at 20:13

## 2017-09-21 ASSESSMENT — PAIN DESCRIPTION - FREQUENCY: FREQUENCY: INTERMITTENT

## 2017-09-21 ASSESSMENT — PAIN DESCRIPTION - PAIN TYPE: TYPE: ACUTE PAIN

## 2017-09-21 ASSESSMENT — PAIN DESCRIPTION - DESCRIPTORS: DESCRIPTORS: ACHING

## 2017-09-21 ASSESSMENT — ENCOUNTER SYMPTOMS
BACK PAIN: 0
SINUS PRESSURE: 0
TROUBLE SWALLOWING: 0
ABDOMINAL PAIN: 0
DIARRHEA: 0
COUGH: 0
VOMITING: 0
SHORTNESS OF BREATH: 0
CHEST TIGHTNESS: 0

## 2017-09-21 ASSESSMENT — PAIN DESCRIPTION - ONSET: ONSET: ON-GOING

## 2017-09-21 ASSESSMENT — PAIN SCALES - GENERAL: PAINLEVEL_OUTOF10: 5

## 2017-09-21 ASSESSMENT — PAIN DESCRIPTION - ORIENTATION: ORIENTATION: RIGHT

## 2017-09-21 ASSESSMENT — PAIN DESCRIPTION - LOCATION: LOCATION: OTHER (COMMENT)

## 2017-09-21 ASSESSMENT — PAIN DESCRIPTION - PROGRESSION: CLINICAL_PROGRESSION: GRADUALLY WORSENING

## 2017-09-22 LAB
EKG ATRIAL RATE: 83 BPM
EKG P AXIS: 59 DEGREES
EKG P-R INTERVAL: 152 MS
EKG Q-T INTERVAL: 364 MS
EKG QRS DURATION: 80 MS
EKG QTC CALCULATION (BAZETT): 427 MS
EKG R AXIS: 4 DEGREES
EKG T AXIS: 36 DEGREES
EKG VENTRICULAR RATE: 83 BPM
GFR AFRICAN AMERICAN: 47
GFR NON-AFRICAN AMERICAN: 39
PERFORMED ON: ABNORMAL
POC CREATININE: 1.4 MG/DL (ref 0.6–1.1)
POC SAMPLE TYPE: ABNORMAL

## 2017-09-22 PROCEDURE — 93010 ELECTROCARDIOGRAM REPORT: CPT | Performed by: INTERNAL MEDICINE

## 2019-01-01 ENCOUNTER — HOSPITAL ENCOUNTER (EMERGENCY)
Age: 59
Discharge: HOME | End: 2019-07-26
Attending: EMERGENCY MEDICINE
Payer: MEDICAID

## 2019-01-01 ENCOUNTER — OFFICE VISIT (OUTPATIENT)
Dept: PALLATIVE CARE | Age: 59
End: 2019-01-01
Payer: MEDICAID

## 2019-01-01 ENCOUNTER — OFFICE VISIT (OUTPATIENT)
Dept: BEHAVIORAL/MENTAL HEALTH CLINIC | Age: 59
End: 2019-01-01
Payer: MEDICAID

## 2019-01-01 ENCOUNTER — TELEPHONE (OUTPATIENT)
Dept: PALLATIVE CARE | Age: 59
End: 2019-01-01

## 2019-01-01 ENCOUNTER — APPOINTMENT (OUTPATIENT)
Dept: GENERAL RADIOLOGY | Age: 59
End: 2019-01-01
Payer: MEDICAID

## 2019-01-01 VITALS
SYSTOLIC BLOOD PRESSURE: 112 MMHG | WEIGHT: 229 LBS | HEART RATE: 76 BPM | DIASTOLIC BLOOD PRESSURE: 65 MMHG | BODY MASS INDEX: 38.11 KG/M2 | TEMPERATURE: 98.9 F | RESPIRATION RATE: 20 BRPM | OXYGEN SATURATION: 98 %

## 2019-01-01 VITALS
TEMPERATURE: 97.5 F | HEART RATE: 124 BPM | WEIGHT: 230 LBS | RESPIRATION RATE: 16 BRPM | BODY MASS INDEX: 38.27 KG/M2 | SYSTOLIC BLOOD PRESSURE: 176 MMHG | DIASTOLIC BLOOD PRESSURE: 91 MMHG | OXYGEN SATURATION: 100 %

## 2019-01-01 VITALS
HEART RATE: 90 BPM | SYSTOLIC BLOOD PRESSURE: 141 MMHG | BODY MASS INDEX: 35.08 KG/M2 | WEIGHT: 210.8 LBS | DIASTOLIC BLOOD PRESSURE: 66 MMHG

## 2019-01-01 VITALS
TEMPERATURE: 97.5 F | OXYGEN SATURATION: 98 % | WEIGHT: 234 LBS | DIASTOLIC BLOOD PRESSURE: 72 MMHG | HEART RATE: 68 BPM | RESPIRATION RATE: 20 BRPM | SYSTOLIC BLOOD PRESSURE: 135 MMHG | BODY MASS INDEX: 38.94 KG/M2

## 2019-01-01 VITALS
RESPIRATION RATE: 16 BRPM | HEART RATE: 78 BPM | OXYGEN SATURATION: 99 % | BODY MASS INDEX: 38.54 KG/M2 | SYSTOLIC BLOOD PRESSURE: 138 MMHG | TEMPERATURE: 97.3 F | WEIGHT: 231.6 LBS | DIASTOLIC BLOOD PRESSURE: 69 MMHG

## 2019-01-01 VITALS
DIASTOLIC BLOOD PRESSURE: 60 MMHG | HEIGHT: 65 IN | RESPIRATION RATE: 20 BRPM | TEMPERATURE: 97.9 F | WEIGHT: 230.2 LBS | SYSTOLIC BLOOD PRESSURE: 149 MMHG | BODY MASS INDEX: 38.35 KG/M2 | OXYGEN SATURATION: 98 % | HEART RATE: 66 BPM

## 2019-01-01 DIAGNOSIS — G89.29 CHRONIC NONINTRACTABLE HEADACHE, UNSPECIFIED HEADACHE TYPE: Primary | ICD-10-CM

## 2019-01-01 DIAGNOSIS — F32.89 OTHER DEPRESSION: Primary | ICD-10-CM

## 2019-01-01 DIAGNOSIS — G47.00 INSOMNIA, UNSPECIFIED TYPE: ICD-10-CM

## 2019-01-01 DIAGNOSIS — F32.A DEPRESSION, UNSPECIFIED DEPRESSION TYPE: ICD-10-CM

## 2019-01-01 DIAGNOSIS — R63.4 WEIGHT LOSS, UNINTENTIONAL: ICD-10-CM

## 2019-01-01 DIAGNOSIS — C50.411 MALIGNANT NEOPLASM OF UPPER-OUTER QUADRANT OF RIGHT FEMALE BREAST, UNSPECIFIED ESTROGEN RECEPTOR STATUS (HCC): ICD-10-CM

## 2019-01-01 DIAGNOSIS — R53.1 GENERAL WEAKNESS: ICD-10-CM

## 2019-01-01 DIAGNOSIS — C50.919 PRIMARY MALIGNANT NEOPLASM OF BREAST WITH METASTASIS TO OTHER SITE, UNSPECIFIED LATERALITY (HCC): ICD-10-CM

## 2019-01-01 DIAGNOSIS — R11.2 CINV (CHEMOTHERAPY-INDUCED NAUSEA AND VOMITING): ICD-10-CM

## 2019-01-01 DIAGNOSIS — C79.31 SECONDARY MALIGNANT NEOPLASM OF BRAIN (HCC): ICD-10-CM

## 2019-01-01 DIAGNOSIS — R51.9 CHRONIC NONINTRACTABLE HEADACHE, UNSPECIFIED HEADACHE TYPE: Primary | ICD-10-CM

## 2019-01-01 DIAGNOSIS — K59.00 CONSTIPATION, UNSPECIFIED CONSTIPATION TYPE: ICD-10-CM

## 2019-01-01 DIAGNOSIS — G47.09 OTHER INSOMNIA: Primary | ICD-10-CM

## 2019-01-01 DIAGNOSIS — Z51.5 PALLIATIVE CARE ENCOUNTER: ICD-10-CM

## 2019-01-01 DIAGNOSIS — C50.919 MALIGNANT NEOPLASM OF FEMALE BREAST, UNSPECIFIED ESTROGEN RECEPTOR STATUS, UNSPECIFIED LATERALITY, UNSPECIFIED SITE OF BREAST (HCC): ICD-10-CM

## 2019-01-01 DIAGNOSIS — C79.9 METASTATIC CANCER (HCC): ICD-10-CM

## 2019-01-01 DIAGNOSIS — Z71.89 ENCOUNTER FOR HOSPICE CARE DISCUSSION: ICD-10-CM

## 2019-01-01 DIAGNOSIS — F43.21 GRIEVING: ICD-10-CM

## 2019-01-01 DIAGNOSIS — T45.1X5A CINV (CHEMOTHERAPY-INDUCED NAUSEA AND VOMITING): ICD-10-CM

## 2019-01-01 DIAGNOSIS — H92.01 EAR PAIN, RIGHT: Primary | ICD-10-CM

## 2019-01-01 DIAGNOSIS — R06.09 DYSPNEA ON EXERTION: ICD-10-CM

## 2019-01-01 DIAGNOSIS — R53.83 OTHER FATIGUE: ICD-10-CM

## 2019-01-01 DIAGNOSIS — F51.01 PRIMARY INSOMNIA: ICD-10-CM

## 2019-01-01 DIAGNOSIS — R41.82 ALTERED MENTAL STATUS, UNSPECIFIED ALTERED MENTAL STATUS TYPE: Primary | ICD-10-CM

## 2019-01-01 DIAGNOSIS — G89.3 PAIN, NEOPLASM-RELATED: Primary | ICD-10-CM

## 2019-01-01 PROCEDURE — 99214 OFFICE O/P EST MOD 30 MIN: CPT | Performed by: FAMILY MEDICINE

## 2019-01-01 PROCEDURE — 1036F TOBACCO NON-USER: CPT | Performed by: FAMILY MEDICINE

## 2019-01-01 PROCEDURE — G8417 CALC BMI ABV UP PARAM F/U: HCPCS | Performed by: FAMILY MEDICINE

## 2019-01-01 PROCEDURE — 99205 OFFICE O/P NEW HI 60 MIN: CPT | Performed by: FAMILY MEDICINE

## 2019-01-01 PROCEDURE — 96372 THER/PROPH/DIAG INJ SC/IM: CPT

## 2019-01-01 PROCEDURE — 90832 PSYTX W PT 30 MINUTES: CPT | Performed by: PSYCHOLOGIST

## 2019-01-01 PROCEDURE — G8427 DOCREV CUR MEDS BY ELIG CLIN: HCPCS | Performed by: FAMILY MEDICINE

## 2019-01-01 PROCEDURE — 3017F COLORECTAL CA SCREEN DOC REV: CPT | Performed by: FAMILY MEDICINE

## 2019-01-01 PROCEDURE — 90791 PSYCH DIAGNOSTIC EVALUATION: CPT | Performed by: PSYCHOLOGIST

## 2019-01-01 PROCEDURE — 99215 OFFICE O/P EST HI 40 MIN: CPT | Performed by: FAMILY MEDICINE

## 2019-01-01 PROCEDURE — 6360000002 HC RX W HCPCS: Performed by: EMERGENCY MEDICINE

## 2019-01-01 PROCEDURE — G8484 FLU IMMUNIZE NO ADMIN: HCPCS | Performed by: FAMILY MEDICINE

## 2019-01-01 PROCEDURE — 99284 EMERGENCY DEPT VISIT MOD MDM: CPT

## 2019-01-01 RX ORDER — MELATONIN
2000 DAILY
Qty: 60 TABLET | Refills: 1 | Status: SHIPPED | OUTPATIENT
Start: 2019-01-01 | End: 2019-01-01

## 2019-01-01 RX ORDER — SENNOSIDES 8.6 MG
650 CAPSULE ORAL EVERY 8 HOURS PRN
Qty: 60 TABLET | Refills: 1 | Status: SHIPPED
Start: 2019-01-01 | End: 2019-01-01

## 2019-01-01 RX ORDER — LORAZEPAM 2 MG/ML
1 INJECTION INTRAMUSCULAR ONCE
Status: DISCONTINUED | OUTPATIENT
Start: 2019-01-01 | End: 2019-01-01

## 2019-01-01 RX ORDER — CARVEDILOL 12.5 MG/1
12.5 TABLET ORAL
COMMUNITY
Start: 2019-01-01 | End: 2019-01-01

## 2019-01-01 RX ORDER — HYDROMORPHONE HYDROCHLORIDE 4 MG/1
4 TABLET ORAL
COMMUNITY

## 2019-01-01 RX ORDER — 0.9 % SODIUM CHLORIDE 0.9 %
1000 INTRAVENOUS SOLUTION INTRAVENOUS ONCE
Status: DISCONTINUED | OUTPATIENT
Start: 2019-01-01 | End: 2019-01-01 | Stop reason: HOSPADM

## 2019-01-01 RX ORDER — TRAMADOL HYDROCHLORIDE 50 MG/1
50-100 TABLET ORAL EVERY 6 HOURS PRN
Qty: 240 TABLET | Refills: 0 | Status: SHIPPED | OUTPATIENT
Start: 2019-01-01 | End: 2019-01-01

## 2019-01-01 RX ORDER — LANOLIN ALCOHOL/MO/W.PET/CERES
6 CREAM (GRAM) TOPICAL DAILY
Qty: 60 TABLET | Refills: 1 | Status: SHIPPED | OUTPATIENT
Start: 2019-01-01 | End: 2019-01-01 | Stop reason: ALTCHOICE

## 2019-01-01 RX ORDER — ONDANSETRON HYDROCHLORIDE 8 MG/1
8 TABLET, FILM COATED ORAL EVERY 8 HOURS PRN
Qty: 90 TABLET | Refills: 0 | Status: SHIPPED | OUTPATIENT
Start: 2019-01-01

## 2019-01-01 RX ORDER — DIPHENHYDRAMINE HCL 25 MG
25 TABLET ORAL EVERY 4 HOURS PRN
COMMUNITY

## 2019-01-01 RX ORDER — LORAZEPAM 2 MG/ML
2 INJECTION INTRAMUSCULAR ONCE
Status: DISCONTINUED | OUTPATIENT
Start: 2019-01-01 | End: 2019-01-01

## 2019-01-01 RX ORDER — ONDANSETRON HYDROCHLORIDE 8 MG/1
8 TABLET, FILM COATED ORAL
COMMUNITY
Start: 2019-01-01 | End: 2019-01-01 | Stop reason: SDUPTHER

## 2019-01-01 RX ORDER — HALOPERIDOL 5 MG/ML
5 INJECTION INTRAMUSCULAR ONCE
Status: COMPLETED | OUTPATIENT
Start: 2019-01-01 | End: 2019-01-01

## 2019-01-01 RX ORDER — CYANOCOBALAMIN 1000 UG/ML
1000 INJECTION INTRAMUSCULAR; SUBCUTANEOUS
COMMUNITY
Start: 2019-01-01 | End: 2019-01-01

## 2019-01-01 RX ORDER — LORAZEPAM 0.5 MG/1
0.5 TABLET ORAL
COMMUNITY

## 2019-01-01 RX ORDER — LORAZEPAM 2 MG/ML
2 INJECTION INTRAMUSCULAR ONCE
Status: COMPLETED | OUTPATIENT
Start: 2019-01-01 | End: 2019-01-01

## 2019-01-01 RX ORDER — LANOLIN ALCOHOL/MO/W.PET/CERES
6 CREAM (GRAM) TOPICAL DAILY
Qty: 60 TABLET | Refills: 2 | Status: SHIPPED | OUTPATIENT
Start: 2019-01-01

## 2019-01-01 RX ORDER — LORATADINE 10 MG/1
10 TABLET ORAL DAILY
Qty: 7 TABLET | Refills: 0 | Status: SHIPPED | OUTPATIENT
Start: 2019-01-01 | End: 2019-01-01

## 2019-01-01 RX ORDER — AMOXICILLIN 250 MG
1 CAPSULE ORAL DAILY
Qty: 60 TABLET | Refills: 1 | Status: SHIPPED | OUTPATIENT
Start: 2019-01-01

## 2019-01-01 RX ORDER — ACETAMINOPHEN 500 MG
500 TABLET ORAL EVERY 6 HOURS PRN
COMMUNITY

## 2019-01-01 RX ORDER — LANOLIN ALCOHOL/MO/W.PET/CERES
6 CREAM (GRAM) TOPICAL DAILY
Qty: 60 TABLET | Refills: 2 | Status: SHIPPED | OUTPATIENT
Start: 2019-01-01 | End: 2019-01-01 | Stop reason: SDUPTHER

## 2019-01-01 RX ORDER — LOSARTAN POTASSIUM 50 MG/1
50 TABLET ORAL
COMMUNITY
Start: 2019-01-01 | End: 2019-01-01

## 2019-01-01 RX ADMIN — HALOPERIDOL LACTATE 5 MG: 5 INJECTION INTRAMUSCULAR at 18:47

## 2019-01-01 RX ADMIN — LORAZEPAM 2 MG: 2 INJECTION INTRAMUSCULAR; INTRAVENOUS at 18:47

## 2019-01-01 SDOH — HEALTH STABILITY: MENTAL HEALTH: HOW OFTEN DO YOU HAVE A DRINK CONTAINING ALCOHOL?: NEVER

## 2019-01-01 ASSESSMENT — PATIENT HEALTH QUESTIONNAIRE - PHQ9
8. MOVING OR SPEAKING SO SLOWLY THAT OTHER PEOPLE COULD HAVE NOTICED. OR THE OPPOSITE, BEING SO FIGETY OR RESTLESS THAT YOU HAVE BEEN MOVING AROUND A LOT MORE THAN USUAL: 0
SUM OF ALL RESPONSES TO PHQ9 QUESTIONS 1 & 2: 2
SUM OF ALL RESPONSES TO PHQ QUESTIONS 1-9: 7
7. TROUBLE CONCENTRATING ON THINGS, SUCH AS READING THE NEWSPAPER OR WATCHING TELEVISION: 0
10. IF YOU CHECKED OFF ANY PROBLEMS, HOW DIFFICULT HAVE THESE PROBLEMS MADE IT FOR YOU TO DO YOUR WORK, TAKE CARE OF THINGS AT HOME, OR GET ALONG WITH OTHER PEOPLE: 1
SUM OF ALL RESPONSES TO PHQ QUESTIONS 1-9: 3
8. MOVING OR SPEAKING SO SLOWLY THAT OTHER PEOPLE COULD HAVE NOTICED. OR THE OPPOSITE, BEING SO FIGETY OR RESTLESS THAT YOU HAVE BEEN MOVING AROUND A LOT MORE THAN USUAL: 0
3. TROUBLE FALLING OR STAYING ASLEEP: 1
SUM OF ALL RESPONSES TO PHQ QUESTIONS 1-9: 7
1. LITTLE INTEREST OR PLEASURE IN DOING THINGS: 1
SUM OF ALL RESPONSES TO PHQ QUESTIONS 1-9: 3
3. TROUBLE FALLING OR STAYING ASLEEP: 1
5. POOR APPETITE OR OVEREATING: 0
6. FEELING BAD ABOUT YOURSELF - OR THAT YOU ARE A FAILURE OR HAVE LET YOURSELF OR YOUR FAMILY DOWN: 0
2. FEELING DOWN, DEPRESSED OR HOPELESS: 1
4. FEELING TIRED OR HAVING LITTLE ENERGY: 1
10. IF YOU CHECKED OFF ANY PROBLEMS, HOW DIFFICULT HAVE THESE PROBLEMS MADE IT FOR YOU TO DO YOUR WORK, TAKE CARE OF THINGS AT HOME, OR GET ALONG WITH OTHER PEOPLE: 1
10. IF YOU CHECKED OFF ANY PROBLEMS, HOW DIFFICULT HAVE THESE PROBLEMS MADE IT FOR YOU TO DO YOUR WORK, TAKE CARE OF THINGS AT HOME, OR GET ALONG WITH OTHER PEOPLE: 0
SUM OF ALL RESPONSES TO PHQ9 QUESTIONS 1 & 2: 2
SUM OF ALL RESPONSES TO PHQ QUESTIONS 1-9: 5
7. TROUBLE CONCENTRATING ON THINGS, SUCH AS READING THE NEWSPAPER OR WATCHING TELEVISION: 1
6. FEELING BAD ABOUT YOURSELF - OR THAT YOU ARE A FAILURE OR HAVE LET YOURSELF OR YOUR FAMILY DOWN: 0
1. LITTLE INTEREST OR PLEASURE IN DOING THINGS: 1
8. MOVING OR SPEAKING SO SLOWLY THAT OTHER PEOPLE COULD HAVE NOTICED. OR THE OPPOSITE, BEING SO FIGETY OR RESTLESS THAT YOU HAVE BEEN MOVING AROUND A LOT MORE THAN USUAL: 1
1. LITTLE INTEREST OR PLEASURE IN DOING THINGS: 1
SUM OF ALL RESPONSES TO PHQ9 QUESTIONS 1 & 2: 1
7. TROUBLE CONCENTRATING ON THINGS, SUCH AS READING THE NEWSPAPER OR WATCHING TELEVISION: 0
5. POOR APPETITE OR OVEREATING: 0
SUM OF ALL RESPONSES TO PHQ QUESTIONS 1-9: 4
9. THOUGHTS THAT YOU WOULD BE BETTER OFF DEAD, OR OF HURTING YOURSELF: 0
5. POOR APPETITE OR OVEREATING: 0
SUM OF ALL RESPONSES TO PHQ QUESTIONS 1-9: 5
5. POOR APPETITE OR OVEREATING: 1
2. FEELING DOWN, DEPRESSED OR HOPELESS: 1
7. TROUBLE CONCENTRATING ON THINGS, SUCH AS READING THE NEWSPAPER OR WATCHING TELEVISION: 0
3. TROUBLE FALLING OR STAYING ASLEEP: 1
9. THOUGHTS THAT YOU WOULD BE BETTER OFF DEAD, OR OF HURTING YOURSELF: 0
6. FEELING BAD ABOUT YOURSELF - OR THAT YOU ARE A FAILURE OR HAVE LET YOURSELF OR YOUR FAMILY DOWN: 0
3. TROUBLE FALLING OR STAYING ASLEEP: 1
SUM OF ALL RESPONSES TO PHQ9 QUESTIONS 1 & 2: 2
9. THOUGHTS THAT YOU WOULD BE BETTER OFF DEAD, OR OF HURTING YOURSELF: 0
6. FEELING BAD ABOUT YOURSELF - OR THAT YOU ARE A FAILURE OR HAVE LET YOURSELF OR YOUR FAMILY DOWN: 0
2. FEELING DOWN, DEPRESSED OR HOPELESS: 0
4. FEELING TIRED OR HAVING LITTLE ENERGY: 2
4. FEELING TIRED OR HAVING LITTLE ENERGY: 1
SUM OF ALL RESPONSES TO PHQ QUESTIONS 1-9: 4
4. FEELING TIRED OR HAVING LITTLE ENERGY: 1
2. FEELING DOWN, DEPRESSED OR HOPELESS: 1
9. THOUGHTS THAT YOU WOULD BE BETTER OFF DEAD, OR OF HURTING YOURSELF: 0
8. MOVING OR SPEAKING SO SLOWLY THAT OTHER PEOPLE COULD HAVE NOTICED. OR THE OPPOSITE, BEING SO FIGETY OR RESTLESS THAT YOU HAVE BEEN MOVING AROUND A LOT MORE THAN USUAL: 0
1. LITTLE INTEREST OR PLEASURE IN DOING THINGS: 1

## 2019-01-01 ASSESSMENT — ENCOUNTER SYMPTOMS
RECTAL PAIN: 0
CONSTIPATION: 0
APNEA: 0
NAUSEA: 0
SHORTNESS OF BREATH: 1
TROUBLE SWALLOWING: 0
GASTROINTESTINAL NEGATIVE: 1
DIARRHEA: 0
STRIDOR: 0
GASTROINTESTINAL NEGATIVE: 1
SINUS PAIN: 0
CONSTIPATION: 1
SHORTNESS OF BREATH: 0
SINUS PAIN: 0
PHOTOPHOBIA: 0
NAUSEA: 0
SHORTNESS OF BREATH: 0
ABDOMINAL PAIN: 0
SINUS PRESSURE: 0
EYE ITCHING: 0
DIARRHEA: 0
CONSTIPATION: 0
NAUSEA: 0
COLOR CHANGE: 0
FACIAL SWELLING: 0
CHEST TIGHTNESS: 0
COUGH: 0
COUGH: 0
WHEEZING: 0
NAUSEA: 0
CONSTIPATION: 0
ALLERGIC/IMMUNOLOGIC NEGATIVE: 1
SHORTNESS OF BREATH: 0
RHINORRHEA: 0
EYE DISCHARGE: 0
CONSTIPATION: 0
COUGH: 0
CHEST TIGHTNESS: 0
WHEEZING: 0
TROUBLE SWALLOWING: 0
EYES NEGATIVE: 1
SHORTNESS OF BREATH: 0
WHEEZING: 0
ALLERGIC/IMMUNOLOGIC NEGATIVE: 1
VOMITING: 0
SHORTNESS OF BREATH: 0
BLOOD IN STOOL: 0
GASTROINTESTINAL NEGATIVE: 1
ALLERGIC/IMMUNOLOGIC NEGATIVE: 1
EYE PAIN: 0
WHEEZING: 0
SORE THROAT: 0
BACK PAIN: 0
EYE REDNESS: 0
SORE THROAT: 0
NAUSEA: 0
CHOKING: 0
ABDOMINAL DISTENTION: 0
CONSTIPATION: 0
BACK PAIN: 0
VOMITING: 0
WHEEZING: 0
ABDOMINAL PAIN: 0
COUGH: 0
VOICE CHANGE: 0
COUGH: 0

## 2019-03-22 PROBLEM — E78.2 MIXED HYPERLIPIDEMIA: Status: ACTIVE | Noted: 2017-04-19

## 2019-03-22 PROBLEM — I50.22 CHRONIC SYSTOLIC HEART FAILURE (HCC): Status: ACTIVE | Noted: 2018-04-02

## 2019-03-22 PROBLEM — K21.9 GASTROESOPHAGEAL REFLUX DISEASE: Status: ACTIVE | Noted: 2018-01-01

## 2019-03-22 PROBLEM — C79.31 SECONDARY MALIGNANT NEOPLASM OF BRAIN (HCC): Status: ACTIVE | Noted: 2018-01-01

## 2019-03-22 PROBLEM — Z95.828: Status: ACTIVE | Noted: 2018-01-01

## 2019-03-22 PROBLEM — C50.419 MALIGNANT NEOPLASM OF UPPER-OUTER QUADRANT OF FEMALE BREAST (HCC): Status: ACTIVE | Noted: 2017-02-20

## 2019-03-22 PROBLEM — C79.51 SECONDARY MALIGNANT NEOPLASM OF BONE (HCC): Status: ACTIVE | Noted: 2018-01-01

## 2019-03-22 PROBLEM — R41.82 ALTERED MENTAL STATUS: Status: RESOLVED | Noted: 2017-07-19 | Resolved: 2019-01-01

## 2019-03-22 PROBLEM — T45.1X5A PERIPHERAL NEUROPATHY DUE TO CHEMOTHERAPY (HCC): Status: ACTIVE | Noted: 2017-08-02

## 2019-03-22 PROBLEM — I42.8 NON-ISCHEMIC CARDIOMYOPATHY (HCC): Status: ACTIVE | Noted: 2017-09-26

## 2019-03-22 PROBLEM — R53.1 WEAKNESS: Status: ACTIVE | Noted: 2017-08-11

## 2019-03-22 PROBLEM — I10 BENIGN ESSENTIAL HTN: Status: ACTIVE | Noted: 2017-04-19

## 2019-03-22 PROBLEM — G62.0 PERIPHERAL NEUROPATHY DUE TO CHEMOTHERAPY (HCC): Status: ACTIVE | Noted: 2017-08-02

## 2019-03-22 NOTE — PROGRESS NOTES
Subjective:      Patient Id: Seen [facility] at [facility] single:[id number]::\" home in ***\",\"the clinic at the ***\",\"[place]\",\"[place]\",\"[facility]\",\"[place]\"[place]\"[place]\",\"[place]\",\"[place]\",\"[place]\"[place]\",\"[place]\",\"[facility]\",\"[place]\"} in follow up for symptom management. She was accompanied to the appointment by: ***. Chief Complaint   Patient presents with    Insomnia    Fatigue    Constipation     intermittent    Shortness of Breath     with ambulation        HPI    Past Medical History:   Diagnosis Date    CAD (coronary artery disease)     Cancer ([facility]     Cerebral artery occlusion with cerebral infarction ([facility]     Headache     Hyperlipidemia     Hypertension     Leukocytosis     Pancreatitis     Thrombocytopenia (HCC)      No past surgical history on file.   Social History     Socioeconomic History    Marital status: Single     Spouse name: Not on file    Number of children: Not on file    Years of education: Not on file    Highest education level: Not on file   Occupational History    Occupation: [employer]     Employer: [employer] resource strain: Not on file    Food insecurity:     Worry: Not on file     Inability: Not on file    Transportation needs:     Medical: Not on file     Non-medical: Not on file   Tobacco Use    Smoking status: Never Smoker    Smokeless tobacco: Never Used   Substance and Sexual Activity    Alcohol use: No     Frequency: Never    Drug use: No    Sexual activity: Never   Lifestyle    Physical activity:     Days per week: Not on file     Minutes per session: Not on file    Stress: Not on file   Relationships    Social connections:     Talks on phone: Not on file     Gets together: Not on file     Attends Orthodoxy service: Not on file     Active member of club or organization: Not on file     Attends meetings of clubs or organizations: Not on file     Relationship status: Not on file    Intimate partner violence:     Fear of current or ex partner: Not on file     Emotionally abused: Not on file     Physically abused: Not on file     Forced sexual activity: Not on file   Other Topics Concern    Not on file   Social History Narrative    Not on file     Family History   Problem Relation Age of Onset    Cancer Mother     Cancer Father     Cancer Sister     Heart Disease Sister     High Blood Pressure Sister     High Cholesterol Sister     Cancer Brother      Allergies   Allergen Reactions    Hydrochlorothiazide     Penicillins     Dexamethasone Anxiety     Can't sleep, mental changes. Current Outpatient Medications on File Prior to Visit   Medication Sig Dispense Refill    losartan (COZAAR) 50 MG tablet Take 50 mg by mouth      carvedilol (COREG) 12.5 MG tablet Take 12.5 mg by mouth      cyanocobalamin 1000 MCG/ML injection Inject 1,000 mcg into the muscle      pantoprazole (PROTONIX) 40 MG tablet Take 40 mg by mouth daily      folic acid (FOLVITE) 1 MG tablet Take 1 mg by mouth daily      dicyclomine (BENTYL) 10 MG capsule Take 10 mg by mouth every 6 hours as needed 1-2 tablets       No current facility-administered medications on file prior to visit. Review of Systems  Reviewed recent {Gyn blood tests:03144} and {eprp labs imagin}. Objective:   BP (!) 149/60 (Site: Left Upper Arm, Position: Sitting)   Pulse 66   Temp 97.9 °F (36.6 °C)   Resp 20   Ht 5' 5\" (1.651 m)   Wt 230 lb 3.2 oz (104.4 kg)   SpO2 98%   BMI 38.31 kg/m²    Wt Readings from Last 3 Encounters:   19 230 lb 3.2 oz (104.4 kg)   17 207 lb (93.9 kg)   17 213 lb 10 oz (96.9 kg)     Physical Exam   Constitutional: She is oriented to person, place, and time. She appears well-developed and well-nourished. HENT:   Head: Normocephalic. Eyes: Pupils are equal, round, and reactive to light. EOM are normal.   Neck: Normal range of motion.

## 2019-03-25 NOTE — PROGRESS NOTES
Patient Id: Seen Siria De La Torre at , for initial palliative medicine consult requested by PCP Echo Thomas (Matt)   fo symptom management,advance care planning, goals of care discussion . She was accompanied to the appointment by: her daughters ×2    Chief Complaint   Patient presents with    Insomnia    Fatigue    Constipation     intermittent    Shortness of Breath     with ambulation       HPI   Ms. Norberto Scott was diagnosed with right breast carcinoma (ER/IL negative; HER2 laya +2) that was diagnosed in February 2017. In September 2018 she started to complain of intermittent right sided facial numbness and paresthesia, she also reported headache, some blurry vision of the right eye and new onset of questionable urinary retention. Brain imaging showed 2 brain lesions consistent with metastases. She is status post  GKRS on 10/03/18 by Dr. Patton President and Dr. Ally Zhang to 2 lesions: 1) Left insular 2) Right cerebellar. Today:   Right-sided facial numbness and paresthesia significantly improved. Intermittent headache significantly improved-resolved. She is complaining of generalized weakness and fatigue associated exertional dyspnea. Continued to be independent with all ADLs. She is complaining of chronic insomnia denies any anxiety, depressed mood suicidal or homicidal ideation auditory  or visual hallucination. Chronic intermittent constipations. Denies any associated abdominal pain nausea or vomiting denies any black or tarry stool. Denies any other constitutional symptom there is no fever, chills ,chest pain, palpitation, nausea or vomiting. Past Medical History:   Diagnosis Date    CAD (coronary artery disease)     Cancer ([address]     Cerebral artery occlusion with cerebral infarction ([address]     Headache     Hyperlipidemia     Hypertension     Leukocytosis     Pancreatitis     Thrombocytopenia ([address]      History reviewed. No pertinent surgical history.   Social History     Socioeconomic History    Marital status: Single     Spouse name: Not on file    Number of children: Not on file    Years of education: Not on file    Highest education level: Not on file   Occupational History    Occupation: [employer]     Employer: [employer] resource strain: Not on file    Food insecurity:     Worry: Not on file     Inability: Not on file    Transportation needs:     Medical: Not on file     Non-medical: Not on file   Tobacco Use    Smoking status: Never Smoker    Smokeless tobacco: Never Used   Substance and Sexual Activity    Alcohol use: No     Frequency: Never    Drug use: No    Sexual activity: Never   Lifestyle    Physical activity:     Days per week: Not on file     Minutes per session: Not on file    Stress: Not on file   Relationships    Social connections:     Talks on phone: Not on file     Gets together: Not on file     Attends Pentecostal service: Not on file     Active member of club or organization: Not on file     Attends meetings of clubs or organizations: Not on file     Relationship status: Not on file    Intimate partner violence:     Fear of current or ex partner: Not on file     Emotionally abused: Not on file     Physically abused: Not on file     Forced sexual activity: Not on file   Other Topics Concern    Not on file   Social History Narrative    Not on file     Family History   Problem Relation Age of Onset    Cancer Mother     Cancer Father     Cancer Sister     Heart Disease Sister     High Blood Pressure Sister     High Cholesterol Sister     Cancer Brother      Allergies   Allergen Reactions    Hydrochlorothiazide     Penicillins     Dexamethasone Anxiety     Can't sleep, mental changes.     Sucralfate Rash     states not an allergy     Current Outpatient Medications on File Prior to Visit   Medication Sig Dispense Refill    losartan (COZAAR) 50 MG tablet Take 50 mg by mouth      carvedilol (COREG) 12.5 MG tablet Take 12.5 mg by mouth      cyanocobalamin 1000 MCG/ML injection Inject 1,000 mcg into the muscle      pantoprazole (PROTONIX) 40 MG tablet Take 40 mg by mouth daily      folic acid (FOLVITE) 1 MG tablet Take 1 mg by mouth daily      dicyclomine (BENTYL) 10 MG capsule Take 10 mg by mouth every 6 hours as needed 1-2 tablets       No current facility-administered medications on file prior to visit. Review of Systems      Objective:   BP (!) 149/60 (Site: Left Upper Arm, Position: Sitting)   Pulse 66   Temp 97.9 °F (36.6 °C)   Resp 20   Ht 5' 5\" (1.651 m)   Wt 230 lb 3.2 oz (104.4 kg)   SpO2 98%   BMI 38.31 kg/m²    Wt Readings from Last 3 Encounters:   03/21/19 230 lb 3.2 oz (104.4 kg)   09/21/17 207 lb (93.9 kg)   07/20/17 213 lb 10 oz (96.9 kg)     Constitutional: Positive for activity change, appetite change, fatigue and unexpected weight change. Negative for chills, diaphoresis and fever. HENT: Negative for congestion, dental problem, drooling, ear discharge, ear pain, facial swelling, hearing loss, mouth sores, nosebleeds, postnasal drip, rhinorrhea, sinus pressure, sinus pain, sneezing, sore throat, tinnitus, trouble swallowing and voice change. Eyes: Negative for photophobia, pain, discharge, redness, itching and visual disturbance. Respiratory: Positive for shortness of breath. Negative for apnea, cough, choking, chest tightness, wheezing and stridor. Cardiovascular: Negative for chest pain, palpitations and leg swelling. Gastrointestinal: Positive for constipation. Negative for abdominal distention, abdominal pain, anal bleeding, blood in stool, diarrhea, nausea, rectal pain and vomiting. Endocrine: Negative for cold intolerance, heat intolerance, polydipsia, polyphagia and polyuria.    Genitourinary: Negative for decreased urine volume, difficulty urinating, dyspareunia, dysuria, enuresis, flank pain, frequency, genital sores, hematuria, pelvic pain, urgency, vaginal bleeding, vaginal discharge and vaginal pain.   Musculoskeletal: Positive for gait problem and myalgias. Negative for arthralgias, back pain, joint swelling, neck pain and neck stiffness. Skin: Negative for color change, pallor, rash and wound. Allergic/Immunologic: Negative for environmental allergies and food allergies. Neurological: Positive for weakness and numbness. Negative for dizziness, tremors, seizures, syncope, facial asymmetry, speech difficulty, light-headedness and headaches. Hematological: Negative for adenopathy. Bruises/bleeds easily. Psychiatric/Behavioral: Negative for agitation, behavioral problems, confusion, decreased concentration, dysphoric mood, hallucinations, self-injury, sleep disturbance and suicidal ideas. The patient is not nervous/anxious and is not hyperactive. Physical Exam     Constitutional: She is oriented to person, place, and time. She appears well-nourished. No distress. HENT:   Head: Normocephalic and atraumatic. Right Ear: External ear normal.   Left Ear: External ear normal.   Eyes: Conjunctivae are normal. Right eye exhibits no discharge. Left eye exhibits no discharge. No scleral icterus. Neck: Normal range of motion. No JVD present. No tracheal deviation present. Cardiovascular: Normal rate and regular rhythm. Exam reveals no gallop and no friction rub. Pulmonary/Chest: Breath sounds normal. No stridor. No respiratory distress. She has no wheezes. She has no rales. She exhibits no tenderness. Abdominal: Soft. Bowel sounds are normal. She exhibits no distension. There is no tenderness. There is no guarding. Musculoskeletal: Normal range of motion. She exhibits no edema or deformity. Neurological: She is alert and oriented to person, place, and time. No cranial nerve deficit. She exhibits normal muscle tone. Coordination normal.   Skin: Skin is warm and dry. No rash noted. She is not diaphoretic. No erythema. No pallor. Psychiatric: She has a normal mood and affect.  Her behavior is normal. Judgment and thought content normal.       Assessment and Plan:      -Insomnia: The nature of the disease and expectorations including sleeping hygiene discussed in details with the patient. She consented symptomatic management with melatonin 6 mg by mouth daily at bedtime during the follow-up.  -Constipations: Discontinue loperamide. currently on dicyclomine when necessary. Senna S1 tablet daily schedule and 1 tablet daily when necessary.  -Fatigue: Deconditioning with advanced right breast cancer with brain metastasis status post GRKS. Different management options were discussed including PT/OT. No consent consent was made. -Exertional dyspnea: Likely multifactorial including coronary artery disease and COPD. Optimize underlying medical comorbidities. Continue with current medications.  - Breast cancer with brain metastasis status post [address] 10/03/18: Delroy Alex followed regarding this issue with her oncologist and neurosurgery at [facility] [place] [facility]. In the meantime continue with symptomatic management. May consider Neurontin during the follow-up for facial numbness and paresthesia if needed.  -Encounter of palliative care. -Medication reconciliation: Off Norvasc, atorvastatin, aspirin, hydralazine, lisinopril, Zofran, Imodium, Lomtil. - Advance Care Planning   We discussed Living Will (LW), and [address] [place] [address]) as well as their activation. Patient choice discussed. LW/HCPOA in place NO. Code status discussed YES  Code FULL . All related questions were answered completely. - Goals of Care Discussion:  Disease process and goals of treatment were discussed in basic terms. Vernell's goal is to optimize available comfort care measures . We discussed the palliative care philosophy in light of those goals. We discussed all care options including contingent on treatment response and QOL. Much active listening, presence, and emotional support were given.      Discussed with patient/surrogate: POC, Treatment risks/benefits, and alternatives. All questions were answered. Additionally, a printed copy of the CDC medications/opioid safety informational sheet was reviewed and given to patient for reference. Due to acuity, symptomatology and high-risk medication management, I advised patient to Return in about 6 weeks (around 5/2/2019). Medications Discontinued During This Encounter   Medication Reason    amLODIPine (NORVASC) 10 MG tablet LIST CLEANUP    aspirin (ASPIRIN CHILDRENS) 81 MG chewable tablet LIST CLEANUP    atorvastatin (LIPITOR) 10 MG tablet LIST CLEANUP    cholestyramine light 4 g packet LIST CLEANUP    diphenoxylate-atropine (LOMOTIL) 2.5-0.025 MG per tablet LIST CLEANUP    hydrALAZINE (APRESOLINE) 25 MG tablet LIST CLEANUP    HYDROcodone-acetaminophen (NORCO) 5-325 MG per tablet LIST CLEANUP    lisinopril (PRINIVIL;ZESTRIL) 40 MG tablet LIST CLEANUP    loperamide (IMODIUM) 2 MG capsule LIST CLEANUP    ondansetron (ZOFRAN) 4 MG tablet LIST CLEANUP           Thanks for the opportunity you have allowed us to provide palliative care to [facility]. We will be in touch as care progresses. Please feel free to reach out to us should you have any questions or requests.     Total Time 60 mins (Adv. Care planning 17 mins)   > 50% Time Spent Counseling/Care coordination YES       Ritu Shannon MD

## 2019-03-25 NOTE — PROGRESS NOTES
Subjective:      Patient ID: Autumn Paez is a 63 y.o. female.     HPI    Review of Systems    Objective:   Physical Exam    Assessment:      ***      Plan:      ***        Liam Waddell MD

## 2019-03-26 PROBLEM — E66.9 OBESITY, CLASS II, BMI 35-39.9: Status: ACTIVE | Noted: 2018-05-17

## 2019-03-26 PROBLEM — E83.42 HYPOMAGNESEMIA: Status: ACTIVE | Noted: 2017-08-04

## 2019-03-26 NOTE — PROGRESS NOTES
Subjective:      Patient ID: Liz Zacarias is a 63 y.o. female. Fatigue   Associated symptoms include fatigue. Constipation     Shortness of Breath         Review of Systems   Constitutional: Positive for fatigue. Respiratory: Positive for shortness of breath. Gastrointestinal: Positive for constipation.        Objective:   Physical Exam    Assessment:      ***      Plan:           TAMMIE Elder - CNP

## 2019-04-23 NOTE — PROGRESS NOTES
Patient Id: Seen Victoria Valente at , for initial palliative medicine consult requested by PCP Shiv Carreno)   fo symptom management,advance care planning, goals of care discussion . She was accompanied to the appointment by: her daughters ×2    Chief Complaint   Patient presents with    Insomnia    Fatigue    Constipation     intermittent    Shortness of Breath     with ambulation       HPI   Ms. Candi Gilmore was diagnosed with right breast carcinoma (ER/DE negative; HER2 laya +2) that was diagnosed in February 2017. In September 2018 she started to complain of intermittent right sided facial numbness and paresthesia, she also reported headache, some blurry vision of the right eye and new onset of questionable urinary retention. Brain imaging showed 2 brain lesions consistent with metastases. She is status post  GKRS on 10/03/18 by Dr. Maria Isabel Corral and Dr. José Camarillo to 2 lesions: 1) Left insular 2) Right cerebellar. Today:   Right-sided facial numbness and paresthesia significantly improved. Intermittent headache significantly improved-resolved. She is complaining of generalized weakness and fatigue associated exertional dyspnea. Continued to be independent with all ADLs. She is complaining of chronic insomnia denies any anxiety, depressed mood suicidal or homicidal ideation auditory  or visual hallucination. Chronic intermittent constipations. Denies any associated abdominal pain nausea or vomiting denies any black or tarry stool. Denies any other constitutional symptom there is no fever, chills ,chest pain, palpitation, nausea or vomiting. Past Medical History:   Diagnosis Date    CAD (coronary artery disease)     Cancer ([address]     Cerebral artery occlusion with cerebral infarction ([address]     Headache     Hyperlipidemia     Hypertension     Leukocytosis     Pancreatitis     Thrombocytopenia ([address]      History reviewed. No pertinent surgical history.   Social History     Socioeconomic History    Marital status: Single     Spouse name: Not on file    Number of children: Not on file    Years of education: Not on file    Highest education level: Not on file   Occupational History    Occupation: [employer]     Employer: [employer] resource strain: Not on file    Food insecurity:     Worry: Not on file     Inability: Not on file    Transportation needs:     Medical: Not on file     Non-medical: Not on file   Tobacco Use    Smoking status: Never Smoker    Smokeless tobacco: Never Used   Substance and Sexual Activity    Alcohol use: No     Frequency: Never    Drug use: No    Sexual activity: Never   Lifestyle    Physical activity:     Days per week: Not on file     Minutes per session: Not on file    Stress: Not on file   Relationships    Social connections:     Talks on phone: Not on file     Gets together: Not on file     Attends Baptism service: Not on file     Active member of club or organization: Not on file     Attends meetings of clubs or organizations: Not on file     Relationship status: Not on file    Intimate partner violence:     Fear of current or ex partner: Not on file     Emotionally abused: Not on file     Physically abused: Not on file     Forced sexual activity: Not on file   Other Topics Concern    Not on file   Social History Narrative    Not on file     Family History   Problem Relation Age of Onset    Cancer Mother     Cancer Father     Cancer Sister     Heart Disease Sister     High Blood Pressure Sister     High Cholesterol Sister     Cancer Brother      Allergies   Allergen Reactions    Hydrochlorothiazide     Penicillins     Dexamethasone Anxiety     Can't sleep, mental changes.     Sucralfate Rash     states not an allergy     Current Outpatient Medications on File Prior to Visit   Medication Sig Dispense Refill    losartan (COZAAR) 50 MG tablet Take 50 mg by mouth      carvedilol (COREG) 12.5 MG tablet Take 12.5 mg by mouth      pain.   Musculoskeletal: Positive for gait problem and myalgias. Negative for arthralgias, back pain, joint swelling, neck pain and neck stiffness. Skin: Negative for color change, pallor, rash and wound. Allergic/Immunologic: Negative for environmental allergies and food allergies. Neurological: Positive for weakness and numbness. Negative for dizziness, tremors, seizures, syncope, facial asymmetry, speech difficulty, light-headedness and headaches. Hematological: Negative for adenopathy. Bruises/bleeds easily. Psychiatric/Behavioral: Negative for agitation, behavioral problems, confusion, decreased concentration, dysphoric mood, hallucinations, self-injury, sleep disturbance and suicidal ideas. The patient is not nervous/anxious and is not hyperactive. Physical Exam     Constitutional: She is oriented to person, place, and time. She appears well-nourished. No distress. HENT:   Head: Normocephalic and atraumatic. Right Ear: External ear normal.   Left Ear: External ear normal.   Eyes: Conjunctivae are normal. Right eye exhibits no discharge. Left eye exhibits no discharge. No scleral icterus. Neck: Normal range of motion. No JVD present. No tracheal deviation present. Cardiovascular: Normal rate and regular rhythm. Exam reveals no gallop and no friction rub. Pulmonary/Chest: Breath sounds normal. No stridor. No respiratory distress. She has no wheezes. She has no rales. She exhibits no tenderness. Abdominal: Soft. Bowel sounds are normal. She exhibits no distension. There is no tenderness. There is no guarding. Musculoskeletal: Normal range of motion. She exhibits no edema or deformity. Neurological: She is alert and oriented to person, place, and time. No cranial nerve deficit. She exhibits normal muscle tone. Coordination normal.   Skin: Skin is warm and dry. No rash noted. She is not diaphoretic. No erythema. No pallor. Psychiatric: She has a normal mood and affect.  Her behavior is normal. Judgment and thought content normal.       Assessment and Plan:      -Insomnia: The nature of the disease and expectorations including sleeping hygiene discussed in details with the patient. She consented symptomatic management with melatonin 6 mg by mouth daily at bedtime during the follow-up.  -Constipations: Discontinue loperamide. currently on dicyclomine when necessary. Senna S1 tablet daily schedule and 1 tablet daily when necessary.  -Fatigue: Deconditioning with advanced right breast cancer with brain metastasis status post GRKS. Different management options were discussed including PT/OT. No consent consent was made. -Exertional dyspnea: Likely multifactorial including coronary artery disease and COPD. Optimize underlying medical comorbidities. Continue with current medications.  - Breast cancer with brain metastasis status post [address] 10/03/18: Mona Ramirezdinand followed regarding this issue with her oncologist and neurosurgery at [facility]. In the meantime continue with symptomatic management. May consider Neurontin during the follow-up for facial numbness and paresthesia if needed.  -Encounter of palliative care. -Medication reconciliation: Off Norvasc, atorvastatin, aspirin, hydralazine, lisinopril, Zofran, Imodium, Lomtil. - Advance Care Planning   We discussed Living Will (LW), and [facility]) as well as their activation. Patient choice discussed. LW/HCPOA in place NO. Code status discussed YES  Code FULL . All related questions were answered completely. - Goals of Care Discussion:  Disease process and goals of treatment were discussed in basic terms. Vernell's goal is to optimize available comfort care measures . We discussed the palliative care philosophy in light of those goals. We discussed all care options including contingent on treatment response and QOL. Much active listening, presence, and emotional support were given.      Discussed with patient/surrogate: POC, Treatment risks/benefits, and alternatives. All questions were answered. Additionally, a printed copy of the CDC medications/opioid safety informational sheet was reviewed and given to patient for reference. Due to acuity, symptomatology and high-risk medication management, I advised patient to Return in about 6 weeks (around 5/2/2019). Medications Discontinued During This Encounter   Medication Reason    amLODIPine (NORVASC) 10 MG tablet LIST CLEANUP    aspirin (ASPIRIN CHILDRENS) 81 MG chewable tablet LIST CLEANUP    atorvastatin (LIPITOR) 10 MG tablet LIST CLEANUP    cholestyramine light 4 g packet LIST CLEANUP    diphenoxylate-atropine (LOMOTIL) 2.5-0.025 MG per tablet LIST CLEANUP    hydrALAZINE (APRESOLINE) 25 MG tablet LIST CLEANUP    HYDROcodone-acetaminophen (NORCO) 5-325 MG per tablet LIST CLEANUP    lisinopril (PRINIVIL;ZESTRIL) 40 MG tablet LIST CLEANUP    loperamide (IMODIUM) 2 MG capsule LIST CLEANUP    ondansetron (ZOFRAN) 4 MG tablet LIST CLEANUP           Thanks for the opportunity you have allowed us to provide palliative care to [facility]. We will be in touch as care progresses. Please feel free to reach out to us should you have any questions or requests.     Total Time 60 mins (Adv. Care planning 17 mins)   > 50% Time Spent Counseling/Care coordination YES       Jorge Renteria MD

## 2019-04-30 NOTE — PROGRESS NOTES
Subjective:      Patient Id: Seen Derik Herron at the clinic at the [facility] in follow up for symptom management. She was accompanied to the appointment by: self. Chief Complaint   Patient presents with    Otalgia     right        Otalgia    Pertinent negatives include no coughing. Pt seen at the clinic in the [facility] and examined for symptomatic mangement related to breast cancer with Mets to the brain. Pt is A+Ox3, calm, cooperative and in NAD. Patient complains of right ear pain. States pain started 4 days ago. Rates pain as a 2/10. States pain has been slowely dissipating since onset. Denies  headaches, dizziness, or vision changes. Denies excessive fatigue, fever, chills, myalgias, increased sputum production or cough, nausea, vomiting, chest pain, or orthopnea. Patient states her mood is overall happy but experiencing transient depression at times. Appears happy and jigar full at visit. she is requesting a referral to a psychologist.  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 Denies significant Sleep disturbance, anxiety, or agitation episodes; denies suicidal or homicidal ideation or signs suggesting existential grief or spiritual pain. Weight stable, Appetite is good. Currently finished with chemo and radiation treatments. Past Medical History:   Diagnosis Date    CAD (coronary artery disease)     [facility]     Cerebral artery occlusion with cerebral infarction ([facility]     Headache     Hyperlipidemia     Hypertension     Leukocytosis     Pancreatitis     Thrombocytopenia ([facility]      History reviewed. No pertinent surgical history.   Social History     Socioeconomic History    Marital status: Single     Spouse name: Not on file    Number of children: Not on file    Years of education: Not on file    Highest education level: Not on file   Occupational History    Occupation: [employer] tablet Take 2 tablets by mouth daily 60 tablet 1    pantoprazole (PROTONIX) 40 MG tablet Take 40 mg by mouth daily      folic acid (FOLVITE) 1 MG tablet Take 1 mg by mouth daily       No current facility-administered medications on file prior to visit. Review of Systems   Constitutional: Negative. Negative for appetite change, fever and unexpected weight change. HENT: Positive for ear pain. Eyes: Negative. Respiratory: Negative for cough, shortness of breath and wheezing. Cardiovascular: Negative. Negative for chest pain. Gastrointestinal: Negative. Negative for constipation and nausea. Endocrine: Negative. Genitourinary: Negative. Musculoskeletal: Negative. Skin: Negative. Negative for wound. Allergic/Immunologic: Negative. Neurological: Negative. Negative for tremors, weakness and numbness. Hematological: Negative. Psychiatric/Behavioral: Negative. Negative for sleep disturbance and suicidal ideas. The patient is not nervous/anxious. Objective:   /72 (Site: Left Upper Arm, Position: Sitting)   Pulse 68   Temp 97.5 °F (36.4 °C)   Resp 20   Wt 234 lb (106.1 kg)   SpO2 98%   BMI 38.94 kg/m²    Wt Readings from Last 3 Encounters:   04/30/19 234 lb (106.1 kg)   03/21/19 230 lb 3.2 oz (104.4 kg)   09/21/17 207 lb (93.9 kg)     Physical Exam   Constitutional: She is oriented to person, place, and time. She appears well-developed and well-nourished. HENT:   Head: Normocephalic. Eyes: Pupils are equal, round, and reactive to light. EOM are normal.   Neck: Normal range of motion. Cardiovascular: Normal rate and regular rhythm. Pulmonary/Chest: Effort normal and breath sounds normal.   Abdominal: Soft. Bowel sounds are normal. She exhibits no distension. There is no tenderness. There is no guarding. Musculoskeletal: Normal range of motion. She exhibits no edema. Neurological: She is alert and oriented to person, place, and time.    Skin: Skin is

## 2019-04-30 NOTE — PROGRESS NOTES
Subjective:      Patient Id: Seen Siria De La Torre at the clinic at the [facility] in follow up for symptom management. She was accompanied to the appointment by: self. Chief Complaint   Patient presents with    Otalgia     right        HPI       Pt seen at the clinic in the [facility] and examined for symptomatic mangement related to breast cancer with Mets to the brain. Pt is A+Ox3, calm, cooperative and in NAD. Patient complains of right ear pain. States pain started 4 days ago. Rates pain as a 2/10. States pain has been slowely dissipating since onset. Denies  headaches, dizziness, or vision changes. Denies excessive fatigue, fever, chills, myalgias, increased sputum production or cough, nausea, vomiting, chest pain, or orthopnea. Patient states her mood is overall happy but experiencing transient depression at times. Appears happy and jigar full at visit. she is requesting a referral to a psychologist.  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 Denies significant Sleep disturbance, anxiety, or agitation episodes; denies suicidal or homicidal ideation or signs suggesting existential grief or spiritual pain. Weight stable, Appetite is good. Currently finished with chemo and radiation treatments. Past Medical History:   Diagnosis Date    CAD (coronary artery disease)     Cancer ([address]     Cerebral artery occlusion with cerebral infarction ([address]     Headache     Hyperlipidemia     Hypertension     Leukocytosis     Pancreatitis     Thrombocytopenia ([address]      History reviewed. No pertinent surgical history.   Social History     Socioeconomic History    Marital status: Single     Spouse name: Not on file    Number of children: Not on file    Years of education: Not on file    Highest education level: Not on file   Occupational History    Occupation: [employer]     Employer: [address] [place] [address] resource strain: Not on file    Food insecurity:     Worry: Not on file     Inability: Not on file    Transportation needs:     Medical: Not on file     Non-medical: Not on file   Tobacco Use    Smoking status: Never Smoker    Smokeless tobacco: Never Used   Substance and Sexual Activity    Alcohol use: No     Frequency: Never    Drug use: No    Sexual activity: Never   Lifestyle    Physical activity:     Days per week: Not on file     Minutes per session: Not on file    Stress: Not on file   Relationships    Social connections:     Talks on phone: Not on file     Gets together: Not on file     Attends Hoahaoism service: Not on file     Active member of club or organization: Not on file     Attends meetings of clubs or organizations: Not on file     Relationship status: Not on file    Intimate partner violence:     Fear of current or ex partner: Not on file     Emotionally abused: Not on file     Physically abused: Not on file     Forced sexual activity: Not on file   Other Topics Concern    Not on file   Social History Narrative    Not on file     Family History   Problem Relation Age of Onset    Cancer Mother     Cancer Father     Cancer Sister     Heart Disease Sister     High Blood Pressure Sister     High Cholesterol Sister     Cancer Brother      Allergies   Allergen Reactions    Hydrochlorothiazide     Penicillins     Dexamethasone Anxiety     Can't sleep, mental changes.  Sucralfate Rash     states not an allergy     Current Outpatient Medications on File Prior to Visit   Medication Sig Dispense Refill    losartan (COZAAR) 50 MG tablet Take 50 mg by mouth      carvedilol (COREG) 12.5 MG tablet Take 12.5 mg by mouth      cyanocobalamin 1000 MCG/ML injection Inject 1,000 mcg into the muscle      senna-docusate (PERICOLACE) 8.6-50 MG per tablet Take 1 tablet by mouth daily And I tab diluy PRN.  60 tablet 1    melatonin 3 MG TABS tablet Take 2 tablets by mouth daily 60 tablet 1    pantoprazole (PROTONIX) 40 MG tablet Take 40 mg by mouth daily      folic acid (FOLVITE) 1 MG tablet Take 1 mg by mouth daily       No current facility-administered medications on file prior to visit. Review of Systems   Constitutional: Negative. Negative for appetite change, fever and unexpected weight change. HENT: Positive for ear pain (Right Ear). Eyes: Negative. Respiratory: Negative for cough, shortness of breath and wheezing. Cardiovascular: Negative. Negative for chest pain. Gastrointestinal: Negative. Negative for constipation and nausea. Endocrine: Negative. Genitourinary: Negative. Musculoskeletal: Negative. Skin: Negative. Negative for wound. Allergic/Immunologic: Negative. Neurological: Negative. Negative for tremors, weakness and numbness. Hematological: Negative. Psychiatric/Behavioral: Negative. Negative for sleep disturbance and suicidal ideas. The patient is not nervous/anxious. Objective:   /72 (Site: Left Upper Arm, Position: Sitting)   Pulse 68   Temp 97.5 °F (36.4 °C)   Resp 20   Wt 234 lb (106.1 kg)   SpO2 98%   BMI 38.94 kg/m²    Wt Readings from Last 3 Encounters:   04/30/19 234 lb (106.1 kg)   03/21/19 230 lb 3.2 oz (104.4 kg)   09/21/17 207 lb (93.9 kg)     Physical Exam   Constitutional: She is oriented to person, place, and time. She appears well-developed and well-nourished. HENT:   Head: Normocephalic. Eyes: Pupils are equal, round, and reactive to light. EOM are normal.   Neck: Normal range of motion. Cardiovascular: Normal rate and regular rhythm. Pulmonary/Chest: Effort normal and breath sounds normal.   Abdominal: Soft. Bowel sounds are normal. She exhibits no distension. There is no tenderness. There is no guarding. Musculoskeletal: Normal range of motion. She exhibits no edema. Neurological: She is alert and oriented to person, place, and time. Skin: Skin is warm and dry.    Psychiatric: She has a normal mood and affect. Her behavior is normal.   Vitals reviewed. Assessment and Plan:      1. Ear pain, right    - loratadine (CLARITIN) 10 MG tablet; Take 1 tablet by mouth daily for 7 days  Dispense: 7 tablet; Refill: 0    - call in 3-5 days if symptoms worsen or do not subside. 2. Depression, unspecified depression type  - Will refer to psychology     - Joselin Lee, PhD, Psychology, [place]    3. Malignant neoplasm of upper-outer quadrant of right female breast, unspecified estrogen receptor status ([facility]  4. [facility]  - F/U with Oncology as scheduled    5. Palliative care encounter  - Call for any questions, concerns or change in condition. Much support, guidance and active listening provided. Medications Discontinued During This Encounter   Medication Reason    dicyclomine (BENTYL) 10 MG capsule LIST CLEANUP       Discussed with patient/surrogate: POC, Treatment risks/benefits, and alternatives including as noted above. All questions were answered. Gave much active listening, presence, and emotional support. Due to acuity, symptomatology and high-risk medication management,   I advised patient to Return in about 1 month (around 5/28/2019), or if symptoms worsen or fail to improve. Total Time 30 mins   > 50% Time Spent Counseling/Care coordination?  yes     TAMMIE Rubio - CNP    Collaborating physician: Dr. Jg Martin

## 2019-05-07 NOTE — PATIENT INSTRUCTIONS
1. Dedicate 5 minutes 3x/day to practice the deep breathing    2. Review the list of apps and give some a try! ([id number], CBTi  and progressive muscle relaxation for sleep, etc.)    3. Return in about 2 weeks    \"The entire autonomic nervous system (and through it, our internal organs and glands) is largely driven by our breathing patterns. By changing our breathing we can influence millions of biochemical reactions in our body, producing more relaxing substances such as endorphins and fewer anxiety-producing ones like adrenaline and higher blood acidity. Mindfulness of the breath is so effective that it is common to all meditative and prayer traditions. \" Anxiety Fear & Breathing - Breathing. com    \"When overcoming high levels of anxiety, it is important to learn the techniques of correct breathing. Many people who live with high levels of anxiety are known to breathe through their chest. Shallow breathing through the chest means you are disrupting the balance of oxygen and carbon dioxide necessary to be in a relaxed state. This type of breathing will perpetuate the symptoms of anxiety. \" CellEra. com      Diaphragmatic Breathing             _____________________________________________________________________________  1. Sit in a comfortable position    2. Place one hand on your stomach and the other on your chest    3. Try to breathe so that only your stomach rises and falls    As you inhale, concentrate on your chest remaining relatively still while your stomach rises. It may be helpful for you to imagine that your pants are too big and you need to push your stomach out to hold them up. When exhaling, allow your stomach to fall in and the air to fully escape. Inhale slowly. You may choose to hold the air in for about a second. Exhale slowly. Dont push the air out, but just let the natural pressure of your body slowly move it out.     It is normal for this healthy method of breathing to among the options -- then let the audio hypnosis guide you through a session. Relax Melodies  Platform: iPhone and Android  Cost: Free  Anxiety can disrupt healthy sleep patterns in more than one way. First, people who dont get enough sleep tend to feel more anxious. Then, people who are more anxious have a difficult time sleeping. Creating a calming environment may help you fall asleep and stay asleep. Relax to one of this corys 50 sounds. Need the music to stop once youre asleep? Set a timer, and it will stop playing. Set an alarm when you need to be awake. Then, enjoy the benefits of a good nights sleep, free from anxiety. Relaxing Sounds of Nature - Lite  Platform: iPhone  Cost: Free  You can find rest and relaxation without having to travel. The cory comes with 35 nature tracks, which include soothing classics like crickets chirping, breaking waves, and a serene lake. You can download more free tracks to Smadex, and customize a favorite combination that helps you reduce your anxiety in a peaceful setting. Allow the sounds of nature to sweep you away from your worries in the comfort of your living room, office, or bedroom. Relax & Rest Guided Meditations  Platform: Onyu and Android  Cost: $0.99  While group meetings and discussions are always an option, some people find relaxation more easily on their own. This cory lets you relax in the space of your own home or office with three guided meditations. Breath Awareness Guided Meditation (5 minutes), Deep Rested Guided Meditation (13 minutes), and Whole Body Guided Relaxation (24 minutes) are designed specifically to help you relax and sink into a peaceful meditation moment.     Virtual Hope Box  Platform: Steek SAhone and Android  Cost: Free  The [facility] Box (VHB) is a smartphone application that contains simple tools to help with coping, relaxation, distraction, and positive thinking via personalized supportive audio, video, pictures, games, mindfulness exercises, positive messages and activity planning, inspirational quotes, coping statements, and other tools. PTSD : Self-Management of Posttraumatic Stress  Platform: iPhone and Android  Cost: Free  This cory can help you learn about and manage symptoms that often occur after trauma. Provides information and coping skills for common kinds of posttraumatic stress symptoms and problems, including systematic relaxation and self-help techniques. CBT-i : Cognitive Behavioral Therapy for Insomnia  Platform: iPhone  Cost: Free  Identify sleep patterns with a sleep diary and assessment, tools to create new sleep habits, quiet your mind and prevent insomnia in the future. You can set reminders and learn about healthy sleep habits. Mindfulness   Platform: iPhone  Cost: Free  Mindfulness practice to decrease stress, manage emotional discomfort, depression, physical pain, and other problems. It offers exercises, information, and a tracking log to that you can optimize practice.

## 2019-05-07 NOTE — PROGRESS NOTES
Behavioral Health Consultation  Ben Benites, [address]. Psychologist  5/7/19  1:31 PM      Time spent with Patient: 30 minutes  This is patient's first  [place] [facility] appointment. Reason for Consult:  depression  Referring Provider: No referring provider defined for this encounter. Pt provided informed consent for the behavioral health program. Discussed with patient model of service to include the limits of confidentiality (i.e. abuse reporting, suicide intervention, etc.) and short-term intervention focused approach. Pt indicated understanding. Feedback given to PCP. S:  Presenting Concerns:   Pt presents due to some depressive symptoms including sadness and loneliness. She has sleep disturbance such that she will wake frequently the middle of the night but she is often able to go back to sleep. Pt attends appointments and runs errands but does not do much else. She was previously very active in her [employer] and had close connections with friends and family. Pt reports that she has metastatic breast cancer which spread to her brain. Pt had a gamma knife surgery and she was put on steroid prior to surgery. She notes that this seemed to cause some manic behavior (extreme restlessness, not sleeping, impulsive behavior). Pt reports that during this process, she states that she has lost her support system because she was saying \"mean things. \"  She reports that she has no recollection of what she said and no one will tell her. She reports that others reported she was shopping and overspending, even though she was not. Pt reports that she lost the support of her siblings, her best friend, and her [employer] friends. Pt lives alone. Pt reports that she has no children. Pt has several siblings and she helped to raise 2 of them. She reports that she has had no contact with her some of her family, including these two siblings.   She reports that she has 4 siblings that live in the area that she is still close status: Never Smoker    Smokeless tobacco: Never Used   Substance and Sexual Activity    Alcohol use: No     Frequency: Never    Drug use: No    Sexual activity: Never   Lifestyle    Physical activity:     Days per week: Not on file     Minutes per session: Not on file    Stress: Not on file   Relationships    Social connections:     Talks on phone: Not on file     Gets together: Not on file     Attends Orthodox service: Not on file     Active member of club or organization: Not on file     Attends meetings of clubs or organizations: Not on file     Relationship status: Not on file    Intimate partner violence:     Fear of current or ex partner: Not on file     Emotionally abused: Not on file     Physically abused: Not on file     Forced sexual activity: Not on file   Other Topics Concern    Not on file   Social History Narrative    Not on file         Family History:   Family History   Problem Relation Age of Onset    Cancer Mother     Cancer Father     Cancer Sister     Heart Disease Sister     High Blood Pressure Sister     High Cholesterol Sister     Cancer Brother        TOBACCO:   reports that she has never smoked. She has never used smokeless tobacco.  ETOH:   reports that she does not drink alcohol.        O:  MSE:    Appearance    alert, cooperative   Personal Hygiene : appropriately dressed, appropriately groomed, good hygiene and healthy looking  Appetite normal  Sleep disturbance Yes, including: frequent night time awakening  Fatigue Yes  Loss of pleasure Yes  Impulsive behavior No  Speech    spontaneous, normal rate and normal volume  Mood   depressed   Affect    depressed affect  Thought Content    excessive preoccupations  Thought Process    linear, goal directed and coherent  Associations    logical connections  Insight    good  Judgment    good  Orientation    oriented to person, place, time, and general circumstances  Memory    recent and remote memory intact  Attention/Concentration    intact  Morbid ideation No  Suicide Assessment    no suicidal ideation        A:    Symptoms of depression include: depressed mood, insomnia and fatigue    Symptoms of aixa include: none    Symptoms of generalized anxiety include: none noted    Symptoms of panic include: none    PHQ not administered as pt did not receive at check in. Pt is given a diagnosis of Other Specified Depressive Disorder due to depressive episode with insufficient symptoms due to report of frequent sadness, isolation, loss of pleasure and sleep disturbance. Pt would benefit from [facility] services to increase coping skills to provide symptom management/control/relief. Diagnosis:    Other Specified Depressive Disorder        Plan:  Pt interventions:  Provided handout on  stress, Trained in relaxation strategies, Established rapport, Conducted functional assessment, Supportive techniques, Emphasized self-care as important for managing overall health, Explained relaxed breathing technique in detail and practiced this with pt in visit and Provided pt list of websites and several smartphone cory resources for further practicing guided meditations and breathing exercises      Pt Behavioral Change Plan:  1. Dedicate 5 minutes 3x/day to practice the deep breathing    2. Review the list of apps and give some a try! ([id number], CBTi  and progressive muscle relaxation for sleep, etc.)    3. Return in about 2 weeks  Please note this report has been partially produced using speech recognition software and may cause contain errors related to that system including grammar, punctuation and spelling as well as words and phrases that may seem inappropriate. If there are questions or concerns please feel free to contact me to clarify.

## 2019-05-21 NOTE — PROGRESS NOTES
Behavioral Health Consultation  Piedad Hickman Psy.D. Psychologist  5/21/19  1:04 PM      Time spent with Patient: 30 minutes  This is patient's second  [facility] appointment. Reason for Consult:  depression  Referring Provider: TAMMIE Soto CNP      Feedback given to PCP. S:  Pt reports that things have been a bit better. Pt reports that she did have a face to face conversation with her best friend and feels that things are getting better. Pt reports that she has been using the breathing technique and has found this to be helpful. Pt described her ongoing distress related to the psychiatric hospitalization. 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 She states that cancer took so much from her and this additional attack on her identity was even more painful for that reason. Discussed ways of resolving the conflict with her youngest sister. Also introduced the concept of CBT and challenging unhelpful thoughts that get us stuck in negative emotions. Pt was agreeable to review the information and use the chart as needed.   Pt denied SI and HI.     O:  MSE:    Appearance    alert, cooperative  Appetite normal  Sleep disturbance occasional  Fatigue Yes  Loss of pleasure Yes  Impulsive behavior No  Speech    spontaneous, normal rate and normal volume  Mood    neutral  Affect    normal affect  Thought Content    excessive preoccupations  Thought Process    linear, goal directed and coherent  Associations    logical connections  Insight    Good  Judgment    Intact  Orientation    oriented to person, place, time, and general circumstances  Memory    recent and remote memory intact  Attention/Concentration    intact  Morbid ideation No  Suicide Assessment    no suicidal ideation      History:    Medications:   Current Outpatient Medications   Medication Sig Dispense Refill   

## 2019-05-21 NOTE — PATIENT INSTRUCTIONS
1. Review the list of unhelpful thinking patterns and pick out any \"go tos\"  2. Pay attention to your body's red flags that you might be engaged in this type of thinking  3. Jump in and apply rational counter statements to increase the balance in your thinking  4. Return in 2-3 weeks      How To Question Stressful, Angry, Anxious, or Depressed Thinking    1. Am I upsetting myself unnecessarily? How can I see this another way? 2. Is my thinking working for or against me? How could I view this in a less upsetting way? 3. What am I demanding must happen? What do I want or prefer, rather than need? 4. Am I making something too terrible? Is that awful? What would be so terrible about that? 5. Am I labeling a person? What is the action that I dont like? 6. What is untrue about my thoughts? How can I stick to the facts? 7. Am I using extreme, black-and-white language? What words might be more accurate? 8. Am I fortune-telling or mind-reading in a way that gets me upset or unhappy? What are the odds  or chances that it will really turn out the way Im thinking or imagining? 9. What are my options in this situation? How would I like to respond? 10. What are more moderate, helpful, or realistic statements to replace the upsetting ones? 11. Have I had any experiences that show that this thought might not be completely true? 12. If my best friend or someone I loved had this thought, what would I tell them? 15. If someone I cared about knew I was thinking this thought, what would they say to me? 14. Are there strengths in me or positives in the situation that I am ignoring? 15. When I am not feeling this way, do I think about this situation any differently? How?  16. Have I been in this type of situation before? What happened? What have I learned from prior experiences that could help me now? 17. Five years from now, if I look back on this situation, will I look at it any differently?  Will I focus on any different part of my experience? 25. Am I blaming myself for something over which I do not have complete control? 19. Thinking Mistakes That Create Stress, Anger, Depression, Anxiety, and Worry    All-or-nothing thinking. You see things in black-and-white categories. It is either one thing or another; there is no room for anything in between. Im 100% healthy or I must have a fatal disease.   Jumping to conclusions. You make a negative interpretation even though there are no definite facts that convincingly support your conclusion. My  is late because he is in a car accident and is injured on the side of the road.   Fortune-telling. You anticipate things will turn out badly, convinced the prediction is a fact. Not getting this job will cause us to lose the house.    Should statements. Musts and oughts are also offenders. Emotional consequences can include anxiety and anger. I should be able to handle this.    Overgeneralization. Assuming one event is actually a pattern. My hand is a little shaky today, I must have Parkinsons Disease.   Disqualifying the positive. Filtering out or rejecting positive experiences to maintain negative beliefs. Upon hearing that your spouse has checked all the doors and windows and they are all locked you think, But someone could cut out a piece of glass and open the window.   Catastrophizing. Predicting the worst possible outcome imaginable. Terrible, awful, horrible, worst ever might be key words. If I cant get my heart to stop pounding Im going to die.      Superstitious thinking. The thought that something you do prevents something awful from happening. Talat Benja my spouse a hug and telling her to be careful before going to work will prevent her from getting in a wreck. I do it every morning and she hasnt gotten in a wreck yet.   Emotional reasoning.  The belief that because you feel a certain way means that the assumptions and associations you have with that feeling are true. The fear, doom, and constant anxiety must mean something is seriously wrong with me.       Automatic Thoughts (Provided by Postmaster © 2013)    Our thoughts control how we feel about ourselves and the world around us. Positive thoughts lead to us feeling good and negative thoughts can put us down. Sometimes our thoughts happen so quickly that we fail to notice them, but they can still affect our mood. These are called automatic thoughts. Oftentimes, our automatic thoughts are negative and irrational. Identifying these negative  automatic thoughts and replacing them with new rational thoughts (or counterstatement) can improve our mood. Trigger   Automatic Thought     New Thought                                Example:I made a mistake at work. Im probably going to be fired. I always mess up. This is it. Im no good at this job. \"     I messed up, but mistakes happen. Im going to work through this, like I always do.                                                                                Countering Anxiety Provided by [employer]. com © 2012    Come up with a rational counterstatement for each of the following thoughts:    Anxiety-Producing Thought:       I cant go to the mall with my hair like this--everyone will notice me. Rational Counterstatement Example:  My hair looks a little messy, but everyone will be too occupied with other things to notice. Even if they do notice, I doubt they would care.    -------------------------------------------------------------------------------    Anxiety-Producing Thought:  I know I wont be able to finish my work on time. Rational Counterstatement Example:        --------------------------------------------------------------------------------    Anxiety-Producing Thought:  I cant face by boss. Shes going to yell at me.     Rational Counterstatement Example:          Next, think of three examples of anxiety-producing thoughts and rational counterstatements from your own life:    Example 1:    Anxiety-Producing Thought:      Rational Counterstatement:      Example 2:    Anxiety-Producing Thought:      Rational Counterstatement:        Example 3:    Anxiety-Producing Thought:      Rational Counterstatement:

## 2019-06-03 PROBLEM — D72.819 LEUKOPENIA: Status: RESOLVED | Noted: 2017-07-19 | Resolved: 2019-01-01

## 2019-06-04 NOTE — PATIENT INSTRUCTIONS
Continue with CBT chart as needed  Try Progressive Muscle Relaxation. See written instructions below and use the cory CBTi   Return in 3 weeks    Progressive Muscle Relaxation (PMR) reduces tension and is incompatible with anxiety. The action of relaxing the muscles of your body blocks the stress response. PMR can be practiced lying down or in a chair. Each muscle group is tensed for 4-5 seconds and then relaxed for 20-30 seconds and the procedure is repeated at least once. While doing PMR only tighten your muscles enough to feel some tension (about a 1/3 to a 1/2 of fully tense state). You don't want to strain or hold your breath. The goal is to feel what the muscles feel like when they are tense so you can more fully relax them. Focus on the sensations of letting go of the tension and how it feels for the muscle to be completely relaxed. While doing PMR you want to try to notice the difference between the sensations of tension and relaxation. You may find it helpful to say one of the following expressions to yourself while doing PMR. · Let go of tension. · Calm and rested. · Relax and smooth out the muscles. · Let the tension go away. · Let go more and more. · Deeper and Deeper. The following is a procedure for PMR:  1. Get into a comfortable and relaxed posture. Feet on the floor, legs apart, back against your chair. Some people find it's more relaxing to do PMR with their eyes closed. 2. Take a few slow, deep comfortable breaths. Breathe in as deeply as you can, hold for a moment, and exhale. As you breathe in, concentrate just on the sound and feel of the air. As you exhale completely, notice the warmth of the air and silently say the word calm (or choose a statement above) to yourself with each breath you let out. Take a few more slow deep breaths. Imagine your body becoming more relaxed and feeling heavier in your chair each time you exhale.   3. To begin the PMR, start with your legs. Lift your legs slightly off the ground, tense your thighs, and point your toes towards your head. Hold that position and feel the tension. Now let your legs drop to the ground and release all the tension at once. Notice the difference between the way your legs feel now when relaxed and how they felt when they were tense. 4. Next with your palms facing the ceiling, make a fist and raise your arm bringing your fist as close to your shoulder as you can while at the same time pressing your arms to your sides. Feel the tension in your fingers, hands, and arms. Now relax. As you relax you may notice your arms feel warm and heavy. Notice the difference between relaxation and tension in your arms. 5.  Remember to continue breathing slowly and deeply, and scan your legs, arms, and shoulders releasing any excess tension you notice. Focus on the sensation of relaxation in these areas. 6. Next is your face and neck. To tense your neck bring your chin to your chest or the back of your head to the back of your chair. While doing this squint your eyes and slightly bring your bath teeth together tensing just enough to feel the muscles in your jaw. Notice the tension in your face and neck: hold it. Now relax. Let go of all the tension from your face and neck. 7. Continue breathing slowly and enjoy the relaxed feelings throughout your entire body. Scan your body from your head to your toes and notice what your muscles feel like. As you are doing this take 5 more slow deep breaths. Adapted from PRANAY Sahni., Hailee Panchal M.S., Karina(2009). Integrated Behavioral Health in [address] [place] [address] and Rachel Aguilar,. EDGAR Singh,  & Janie Rudolph, [place]. (2000). The relaxation & stress reduction workbook (5th edition).

## 2019-06-25 NOTE — PROGRESS NOTES
Behavioral Health Consultation  Riky Posey Psy.D. Psychologist  6/25/19  2:01 PM      Time spent with Patient: 30 minutes  This is patient's fourth  [facility] appointment. Reason for Consult:  depression and stress  Referring Provider: TAMMIE Vasquez - CNP      Feedback given to PCP. S:  Pt reports that she learned she has some additional areas of brain tumor growth and was therefore not able to proceed with the gamma knife treatment. Pt reported she will be undergoing whole brain radiation. Pt described reaction to this news. Pt reports feeling ok and taking \"one day at a time. \"  Pt attended a new Worship which she is pleased about. Pt described increased support services including VNA services and reaching out to her brother more. Pt reports feeling comfortable with her care team  although concerned about the side effects of radiation. Discussed ongoing outreach to support system.   PT denied SI and HI.    O:  MSE:    Appearance    alert, cooperative  Appetite normal  Sleep disturbance Yes  Fatigue Yes  Loss of pleasure occasional  Impulsive behavior No  Speech    spontaneous, normal rate and normal volume  Mood    netural  Affect    normal affect  Thought Content    intact  Thought Process    linear, goal directed and coherent  Associations    logical connections  Insight    Good  Judgment    Intact  Orientation    oriented to person, place, time, and general circumstances  Memory    recent and remote memory intact  Attention/Concentration    intact  Morbid ideation No  Suicide Assessment    no suicidal ideation      History:    Medications:   Current Outpatient Medications   Medication Sig Dispense Refill    melatonin 3 MG TABS tablet Take 2 tablets by mouth daily 60 tablet 2    Cholecalciferol (VITAMIN D3) 1000 units TABS Take 2 tablets by mouth daily 60 tablet 1    acetaminophen (TYLENOL 8 HOUR ARTHRITIS PAIN) 650 MG extended release tablet Take 1 tablet by mouth every 8 hours as needed

## 2019-07-17 NOTE — PROGRESS NOTES
60 tablet 2    Cholecalciferol (VITAMIN D3) 1000 units TABS Take 2 tablets by mouth daily 60 tablet 1    losartan (COZAAR) 50 MG tablet Take 50 mg by mouth      carvedilol (COREG) 12.5 MG tablet Take 12.5 mg by mouth      senna-docusate (PERICOLACE) 8.6-50 MG per tablet Take 1 tablet by mouth daily And I tab diluy PRN. 60 tablet 1    pantoprazole (PROTONIX) 40 MG tablet Take 40 mg by mouth daily      folic acid (FOLVITE) 1 MG tablet Take 1 mg by mouth daily      cyanocobalamin 1000 MCG/ML injection Inject 1,000 mcg into the muscle       No current facility-administered medications on file prior to visit. Review of Systems   Constitutional: Negative. Negative for appetite change, fever and unexpected weight change. HENT: Negative for ear pain. Eyes: Positive for visual disturbance (intermittent). Respiratory: Negative for cough, shortness of breath and wheezing. Cardiovascular: Negative. Negative for chest pain. Gastrointestinal: Negative. Negative for constipation and nausea. Endocrine: Negative. Genitourinary: Negative. Musculoskeletal: Negative. Skin: Negative. Negative for wound. Allergic/Immunologic: Negative. Neurological: Positive for headaches. Negative for dizziness, tremors, seizures, syncope, facial asymmetry, speech difficulty, weakness, light-headedness and numbness. Hematological: Negative. Psychiatric/Behavioral: Positive for dysphoric mood. Negative for sleep disturbance and suicidal ideas. The patient is not nervous/anxious. Objective:   /65 (Site: Left Upper Arm, Position: Sitting)   Pulse 76   Temp 98.9 °F (37.2 °C)   Resp 20   Wt 229 lb (103.9 kg)   SpO2 98%   BMI 38.11 kg/m²    Wt Readings from Last 3 Encounters:   07/16/19 229 lb (103.9 kg)   06/03/19 231 lb 9.6 oz (105.1 kg)   04/30/19 234 lb (106.1 kg)     Physical Exam   Constitutional: She is oriented to person, place, and time.  She appears well-developed and counseling.   - Patient to follow up with psychology tomorrow and reports great success with coping measures taught there. 6. Palliative care encounter  - Call for any questions, concerns or change in condition. Much support, guidance and active listening provided. Medications Discontinued During This Encounter   Medication Reason    acetaminophen (TYLENOL 8 HOUR ARTHRITIS PAIN) 650 MG extended release tablet LIST CLEANUP       Discussed with patient/surrogate: POC, Treatment risks/benefits, and alternatives including as noted above. All questions were answered. Gave much active listening, presence, and emotional support. Due to acuity, symptomatology and high-risk medication management,   I advised patient to Return in about 4 weeks (around 8/13/2019). Total Time 30 mins   > 50% Time Spent Counseling/Care coordination?  yes     Anton Valente, TAMMIE - CNP    Collaborating physician: Dr. Satish Melchor

## 2019-07-26 NOTE — ED PROVIDER NOTES
[facility] ED  eMERGENCYdEPARTMENT eNCOUnter      Pt Name: Nathalie Groves  MRN: [id number]  [place] 1960  Date of evaluation: 7/26/2019  Provider:Alberto Watkins MD    CHIEF COMPLAINT           HPI  Nathalie Groves is a 63 y.o. female per chart review has a h/o CAD, CVA, HTN, Hpl, pancreatitis presents to the ED with AMS. 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 Pt arrives in the ED axox1. Pt with tangential thoughts, pressured speech, flight of ideas. Pt denies depression, SI/HI, AVH. Pt denies being in any pain. ROS  Review of Systems   Unable to perform ROS: Mental status change       Except as noted above the remainder of the review of systems was reviewed and negative. PAST MEDICAL HISTORY     Past Medical History:   Diagnosis Date    CAD (coronary artery disease)     [facility] [address]     Cerebral artery occlusion with cerebral infarction ([facility] [address]     Headache     Hyperlipidemia     Hypertension     Leukocytosis     Pancreatitis     Thrombocytopenia (HCC)          SURGICAL HISTORY     History reviewed. No pertinent surgical history. CURRENTMEDICATIONS       Previous Medications    ACETAMINOPHEN (TYLENOL) 500 MG TABLET    Take 500 mg by mouth every 6 hours as needed for Pain    CARVEDILOL (COREG) 12.5 MG TABLET    Take 12.5 mg by mouth    CHOLECALCIFEROL (VITAMIN D3) 1000 UNITS TABS    Take 2 tablets by mouth daily    CYANOCOBALAMIN 1000 MCG/ML INJECTION    Inject 1,000 mcg into the muscle    FOLIC ACID (FOLVITE) 1 MG TABLET    Take 1 mg by mouth daily    LOSARTAN (COZAAR) 50 MG TABLET    Take 50 mg by mouth    MELATONIN 3 MG TABS TABLET    Take 2 tablets by mouth daily    ONDANSETRON (ZOFRAN) 8 MG TABLET    Take 8 mg by mouth    PANTOPRAZOLE (PROTONIX) 40 MG TABLET    Take 40 mg by mouth daily    SENNA-DOCUSATE (PERICOLACE) 8.6-50 MG PER TABLET    Take 1 tablet by mouth daily And I tab diluy PRN.        ALLERGIES

## 2019-07-26 NOTE — ED NOTES
Family arrived and wants to sign the Pt out and not have further testing done, Dr Amezcua [place] aware and at bedside.      Gilles Vasquez RN  07/26/19 [id number]

## 2019-07-29 NOTE — TELEPHONE ENCOUNTER
Talked with Nahun Tellez today and she reports episode of attempting to buy a car for a relative and it being misinterpreted as psychosis episode. Stated she was angry when got to ER and did not speak to physician out of protest. Though she remembers the event and the injections that followed and is very disappointed in the care she received. I advised her I cannot weigh in as I was not present and she should call ER physician as I was not prese nt to witness events. Though we did talk about the care I gave and I will follow up with her at her next visit in regards to that. She seemed more pleased after the phone call.

## 2019-08-02 NOTE — PATIENT INSTRUCTIONS
Mistakes That Create Stress, Anger, Depression, Anxiety, and Worry    All-or-nothing thinking. You see things in black-and-white categories. It is either one thing or another; there is no room for anything in between. Im 100% healthy or I must have a fatal disease.   Jumping to conclusions. You make a negative interpretation even though there are no definite facts that convincingly support your conclusion. My  is late because he is in a car accident and is injured on the side of the road.   Fortune-telling. You anticipate things will turn out badly, convinced the prediction is a fact. Not getting this job will cause us to lose the house.    Should statements. Musts and oughts are also offenders. Emotional consequences can include anxiety and anger. I should be able to handle this.    Overgeneralization. Assuming one event is actually a pattern. My hand is a little shaky today, I must have Parkinsons Disease.   Disqualifying the positive. Filtering out or rejecting positive experiences to maintain negative beliefs. Upon hearing that your spouse has checked all the doors and windows and they are all locked you think, But someone could cut out a piece of glass and open the window.   Catastrophizing. Predicting the worst possible outcome imaginable. Terrible, awful, horrible, worst ever might be key words. If I cant get my heart to stop pounding Im going to die.      Superstitious thinking. The thought that something you do prevents something awful from happening. Blase Abide my spouse a hug and telling her to be careful before going to work will prevent her from getting in a wreck. I do it every morning and she hasnt gotten in a wreck yet.   Emotional reasoning. The belief that because you feel a certain way means that the assumptions and associations you have with that feeling are true. The fear, doom, and constant anxiety must mean something is seriously wrong with me. 

## 2019-08-02 NOTE — PROGRESS NOTES
Behavioral Health Consultation  Sushant Garcia Psy.D. Psychologist  8/2/19  10:08 AM      Time spent with Patient: 30 minutes  This is patient's fifth  [facility] appointment. Reason for Consult:  depression and stress  Referring Provider: TAMMIE Moon CNP      Feedback given to PCP. S:  Pt reports that she had radiation treatment and this has been making her much more fatigued. She reports she goes back in next week to see if it helped. She reports having some difficulty with motor functioning, noting that she was unable to put on her socks this morning. She also recognizes that her speech is impaired at times as she has difficulty with pronouncing some words or uses the wrong word. Pt described feeling that many of her friends and family dislike her and she has recently \"cut off\" her brother and sister-in-law. Discussed possibility of talking things over with them or writing a letter to try to repair the relationship. Pt reports that she is keeping up contact with other supportive people. Pt also shared incident which led to recent trip to ER in which she was discussing buying a car for a person she did not know. She maintains that this behavior was not particularly unusual or concerning for her. Pt shared that she was hospitalized a couple other times after becoming sick of radiation. Pt shared that she her sleep is \"good\" but also noted that she is sometimes staying up very late. Pt shared that she is continuing to see someone for social work and has VNA services as well.   PT denied SI and HI.    O:  MSE:    Appearance    alert, cooperative  Appetite normal  Sleep disturbance Yes  Fatigue Yes  Loss of pleasure Yes  Impulsive behavior Yes  Speech    spontaneous, normal volume and slow  Mood    Irritability  Affect    Agitation (mild)  Thought Content    cognitive distortions  Thought Process    linear, goal directed, coherent and tangential  Associations    logical connections, tangential

## 2019-08-20 NOTE — PROGRESS NOTES
No    Sexual activity: Never   Lifestyle    Physical activity:     Days per week: Not on file     Minutes per session: Not on file    Stress: Not on file   Relationships    Social connections:     Talks on phone: Not on file     Gets together: Not on file     Attends Christianity service: Not on file     Active member of club or organization: Not on file     Attends meetings of clubs or organizations: Not on file     Relationship status: Not on file    Intimate partner violence:     Fear of current or ex partner: Not on file     Emotionally abused: Not on file     Physically abused: Not on file     Forced sexual activity: Not on file   Other Topics Concern    Not on file   Social History Narrative    Not on file     Family History   Problem Relation Age of Onset    Cancer Mother     Cancer Father     Cancer Sister     Heart Disease Sister     High Blood Pressure Sister     High Cholesterol Sister     Cancer Brother      Allergies   Allergen Reactions    Hydrochlorothiazide     Penicillins     Dexamethasone Anxiety     Can't sleep, mental changes.  Sucralfate Rash     states not an allergy     Current Outpatient Medications on File Prior to Visit   Medication Sig Dispense Refill    acetaminophen (TYLENOL) 500 MG tablet Take 500 mg by mouth every 6 hours as needed for Pain      Cholecalciferol (VITAMIN D3) 1000 units TABS Take 2 tablets by mouth daily 60 tablet 1    losartan (COZAAR) 50 MG tablet Take 50 mg by mouth      carvedilol (COREG) 12.5 MG tablet Take 12.5 mg by mouth      cyanocobalamin 1000 MCG/ML injection Inject 1,000 mcg into the muscle      senna-docusate (PERICOLACE) 8.6-50 MG per tablet Take 1 tablet by mouth daily And I tab diluy PRN. 60 tablet 1    pantoprazole (PROTONIX) 40 MG tablet Take 40 mg by mouth daily      folic acid (FOLVITE) 1 MG tablet Take 1 mg by mouth daily       No current facility-administered medications on file prior to visit.         Review of Systems

## 2019-08-24 PROBLEM — C79.31 BREAST CANCER METASTASIZED TO BRAIN, UNSPECIFIED LATERALITY (HCC): Status: ACTIVE | Noted: 2019-01-01

## 2019-08-24 PROBLEM — C50.919 BREAST CANCER METASTASIZED TO BRAIN, UNSPECIFIED LATERALITY (HCC): Status: ACTIVE | Noted: 2019-01-01
